# Patient Record
Sex: FEMALE | Race: BLACK OR AFRICAN AMERICAN | NOT HISPANIC OR LATINO
[De-identification: names, ages, dates, MRNs, and addresses within clinical notes are randomized per-mention and may not be internally consistent; named-entity substitution may affect disease eponyms.]

---

## 2017-03-06 ENCOUNTER — OTHER (OUTPATIENT)
Age: 62
End: 2017-03-06

## 2017-06-06 ENCOUNTER — APPOINTMENT (OUTPATIENT)
Dept: SURGERY | Facility: CLINIC | Age: 62
End: 2017-06-06

## 2017-06-06 DIAGNOSIS — E04.1 NONTOXIC SINGLE THYROID NODULE: ICD-10-CM

## 2017-07-28 ENCOUNTER — OUTPATIENT (OUTPATIENT)
Dept: OUTPATIENT SERVICES | Facility: HOSPITAL | Age: 62
LOS: 1 days | End: 2017-07-28
Payer: COMMERCIAL

## 2017-07-28 VITALS
SYSTOLIC BLOOD PRESSURE: 138 MMHG | DIASTOLIC BLOOD PRESSURE: 82 MMHG | TEMPERATURE: 97 F | WEIGHT: 169.09 LBS | RESPIRATION RATE: 16 BRPM | HEIGHT: 64.5 IN | HEART RATE: 66 BPM

## 2017-07-28 DIAGNOSIS — Z98.890 OTHER SPECIFIED POSTPROCEDURAL STATES: Chronic | ICD-10-CM

## 2017-07-28 DIAGNOSIS — E04.1 NONTOXIC SINGLE THYROID NODULE: ICD-10-CM

## 2017-07-28 DIAGNOSIS — Z90.710 ACQUIRED ABSENCE OF BOTH CERVIX AND UTERUS: Chronic | ICD-10-CM

## 2017-07-28 LAB
BUN SERPL-MCNC: 18 MG/DL — SIGNIFICANT CHANGE UP (ref 7–23)
CALCIUM SERPL-MCNC: 10.8 MG/DL — HIGH (ref 8.4–10.5)
CHLORIDE SERPL-SCNC: 107 MMOL/L — SIGNIFICANT CHANGE UP (ref 98–107)
CO2 SERPL-SCNC: 25 MMOL/L — SIGNIFICANT CHANGE UP (ref 22–31)
CREAT SERPL-MCNC: 0.9 MG/DL — SIGNIFICANT CHANGE UP (ref 0.5–1.3)
GLUCOSE SERPL-MCNC: 86 MG/DL — SIGNIFICANT CHANGE UP (ref 70–99)
HCT VFR BLD CALC: 39 % — SIGNIFICANT CHANGE UP (ref 34.5–45)
HGB BLD-MCNC: 12.6 G/DL — SIGNIFICANT CHANGE UP (ref 11.5–15.5)
MCHC RBC-ENTMCNC: 28.4 PG — SIGNIFICANT CHANGE UP (ref 27–34)
MCHC RBC-ENTMCNC: 32.3 % — SIGNIFICANT CHANGE UP (ref 32–36)
MCV RBC AUTO: 88 FL — SIGNIFICANT CHANGE UP (ref 80–100)
NRBC # FLD: 0 — SIGNIFICANT CHANGE UP
PLATELET # BLD AUTO: 294 K/UL — SIGNIFICANT CHANGE UP (ref 150–400)
PMV BLD: 10.5 FL — SIGNIFICANT CHANGE UP (ref 7–13)
POTASSIUM SERPL-MCNC: 4.7 MMOL/L — SIGNIFICANT CHANGE UP (ref 3.5–5.3)
POTASSIUM SERPL-SCNC: 4.7 MMOL/L — SIGNIFICANT CHANGE UP (ref 3.5–5.3)
RBC # BLD: 4.43 M/UL — SIGNIFICANT CHANGE UP (ref 3.8–5.2)
RBC # FLD: 13.3 % — SIGNIFICANT CHANGE UP (ref 10.3–14.5)
SODIUM SERPL-SCNC: 146 MMOL/L — HIGH (ref 135–145)
T3 SERPL-MCNC: 248 NG/DL — HIGH (ref 80–200)
T4 FREE SERPL-MCNC: 2.01 NG/DL — HIGH (ref 0.9–1.8)
TSH SERPL-MCNC: 0.01 UIU/ML — LOW (ref 0.27–4.2)
WBC # BLD: 6.57 K/UL — SIGNIFICANT CHANGE UP (ref 3.8–10.5)
WBC # FLD AUTO: 6.57 K/UL — SIGNIFICANT CHANGE UP (ref 3.8–10.5)

## 2017-07-28 PROCEDURE — 93010 ELECTROCARDIOGRAM REPORT: CPT

## 2017-07-28 NOTE — H&P PST ADULT - PROBLEM SELECTOR PLAN 1
Left Thyroid Lobectomy scheduled on 8/14/17.  Pre-op instructions provided. Pt verbalized understanding.   Pepcid provided for GI prophylaxis.   Chlorhexidine wash provided and instructions given.   Left voice message with Shelby in Dr. Fuentes's office to notify of pt's symptoms of numbness/tingling in right hand.

## 2017-07-28 NOTE — H&P PST ADULT - HISTORY OF PRESENT ILLNESS
62 year old female with thyroid nodules getting progressively larger, now causing discomfort with swallowing, presents today for presurgical evaluation for 62 year old female with thyroid nodules getting progressively larger, now causing discomfort with swallowing, presents today for presurgical evaluation for Left Thyroid Lobectomy scheduled on 8/14/17.

## 2017-07-28 NOTE — H&P PST ADULT - PSH
H/O abdominal hysterectomy    S/P Bunionectomy Left Foot    S/P LASIK surgery H/O abdominal hysterectomy  2001  S/P Bunionectomy Left Foot    S/P LASIK surgery

## 2017-07-28 NOTE — H&P PST ADULT - FAMILY HISTORY
Mother  Still living? Unknown  Family history of stroke, Age at diagnosis: Age Unknown     Sibling  Still living? Unknown  Family history of brain cancer, Age at diagnosis: Age Unknown     Father  Still living? Unknown  Family history of lung cancer, Age at diagnosis: Age Unknown

## 2017-07-28 NOTE — H&P PST ADULT - PMH
Hallux Valgus Right Foot    Thyroid Nodule    Uterine leiomyoma Hallux Valgus Right Foot    Thyroid Nodule    Uterine leiomyoma    Vertigo

## 2017-07-28 NOTE — H&P PST ADULT - RS GEN PE MLT RESP DETAILS PC
respirations non-labored/good air movement/breath sounds equal/clear to auscultation bilaterally/airway patent

## 2017-07-28 NOTE — H&P PST ADULT - MUSCULOSKELETAL
details… detailed exam no calf tenderness/ROM intact/no joint warmth/no joint swelling/no joint erythema/normal strength

## 2017-07-28 NOTE — H&P PST ADULT - NEGATIVE ENMT SYMPTOMS
no hearing difficulty/no tinnitus/no throat pain/no dysphagia/no sinus symptoms/no vertigo/no ear pain

## 2017-08-14 ENCOUNTER — APPOINTMENT (OUTPATIENT)
Dept: SURGERY | Facility: HOSPITAL | Age: 62
End: 2017-08-14

## 2017-10-24 ENCOUNTER — OTHER (OUTPATIENT)
Age: 62
End: 2017-10-24

## 2017-11-13 ENCOUNTER — APPOINTMENT (OUTPATIENT)
Dept: SURGERY | Facility: HOSPITAL | Age: 62
End: 2017-11-13

## 2019-01-02 ENCOUNTER — APPOINTMENT (RX ONLY)
Dept: URBAN - METROPOLITAN AREA CLINIC 133 | Facility: CLINIC | Age: 64
Setting detail: DERMATOLOGY
End: 2019-01-02

## 2019-01-02 DIAGNOSIS — L21.8 OTHER SEBORRHEIC DERMATITIS: ICD-10-CM

## 2019-01-02 DIAGNOSIS — L85.3 XEROSIS CUTIS: ICD-10-CM

## 2019-01-02 DIAGNOSIS — L71.8 OTHER ROSACEA: ICD-10-CM

## 2019-01-02 PROBLEM — F41.9 ANXIETY DISORDER, UNSPECIFIED: Status: ACTIVE | Noted: 2019-01-02

## 2019-01-02 PROBLEM — L29.8 OTHER PRURITUS: Status: ACTIVE | Noted: 2019-01-02

## 2019-01-02 PROBLEM — F32.9 MAJOR DEPRESSIVE DISORDER, SINGLE EPISODE, UNSPECIFIED: Status: ACTIVE | Noted: 2019-01-02

## 2019-01-02 PROBLEM — I10 ESSENTIAL (PRIMARY) HYPERTENSION: Status: ACTIVE | Noted: 2019-01-02

## 2019-01-02 PROBLEM — L70.0 ACNE VULGARIS: Status: ACTIVE | Noted: 2019-01-02

## 2019-01-02 PROBLEM — E13.9 OTHER SPECIFIED DIABETES MELLITUS WITHOUT COMPLICATIONS: Status: ACTIVE | Noted: 2019-01-02

## 2019-01-02 PROBLEM — L55.1 SUNBURN OF SECOND DEGREE: Status: ACTIVE | Noted: 2019-01-02

## 2019-01-02 PROBLEM — M12.9 ARTHROPATHY, UNSPECIFIED: Status: ACTIVE | Noted: 2019-01-02

## 2019-01-02 PROBLEM — J44.9 CHRONIC OBSTRUCTIVE PULMONARY DISEASE, UNSPECIFIED: Status: ACTIVE | Noted: 2019-01-02

## 2019-01-02 PROBLEM — I63.50 CEREBRAL INFARCTION DUE TO UNSPECIFIED OCCLUSION OR STENOSIS OF UNSPECIFIED CEREBRAL ARTERY: Status: ACTIVE | Noted: 2019-01-02

## 2019-01-02 PROBLEM — H91.90 UNSPECIFIED HEARING LOSS, UNSPECIFIED EAR: Status: ACTIVE | Noted: 2019-01-02

## 2019-01-02 PROBLEM — E05.90 THYROTOXICOSIS, UNSPECIFIED WITHOUT THYROTOXIC CRISIS OR STORM: Status: ACTIVE | Noted: 2019-01-02

## 2019-01-02 PROBLEM — E78.5 HYPERLIPIDEMIA, UNSPECIFIED: Status: ACTIVE | Noted: 2019-01-02

## 2019-01-02 PROCEDURE — ? COUNSELING

## 2019-01-02 PROCEDURE — ? PRESCRIPTION

## 2019-01-02 PROCEDURE — 99202 OFFICE O/P NEW SF 15 MIN: CPT

## 2019-01-02 RX ORDER — FLUOCINONIDE 0.5 MG/ML
SOLUTION TOPICAL
Qty: 1 | Refills: 3 | Status: ERX | COMMUNITY
Start: 2019-01-02

## 2019-01-02 RX ORDER — METRONIDAZOLE 7.5 MG/G
CREAM TOPICAL
Qty: 1 | Refills: 3 | Status: ERX | COMMUNITY
Start: 2019-01-02

## 2019-01-02 RX ORDER — KETOCONAZOLE 20.5 MG/ML
SHAMPOO, SUSPENSION TOPICAL
Qty: 1 | Refills: 3 | Status: ERX | COMMUNITY
Start: 2019-01-02

## 2019-01-02 RX ADMIN — METRONIDAZOLE: 7.5 CREAM TOPICAL at 00:00

## 2019-01-02 RX ADMIN — KETOCONAZOLE: 20.5 SHAMPOO, SUSPENSION TOPICAL at 00:00

## 2019-01-02 RX ADMIN — FLUOCINONIDE: 0.5 SOLUTION TOPICAL at 00:00

## 2019-01-02 ASSESSMENT — LOCATION SIMPLE DESCRIPTION DERM
LOCATION SIMPLE: SCALP
LOCATION SIMPLE: LEFT FOREARM
LOCATION SIMPLE: NOSE
LOCATION SIMPLE: RIGHT NOSE

## 2019-01-02 ASSESSMENT — LOCATION DETAILED DESCRIPTION DERM
LOCATION DETAILED: RIGHT NASAL SIDEWALL
LOCATION DETAILED: NASAL TIP
LOCATION DETAILED: LEFT DISTAL DORSAL FOREARM
LOCATION DETAILED: LEFT SUPERIOR PARIETAL SCALP

## 2019-01-02 ASSESSMENT — LOCATION ZONE DERM
LOCATION ZONE: ARM
LOCATION ZONE: NOSE
LOCATION ZONE: SCALP

## 2019-01-02 NOTE — HPI: SKIN LESIONS
How Severe Is Your Skin Lesion?: moderate
Have Your Skin Lesions Been Treated?: not been treated
Is This A New Presentation, Or A Follow-Up?: Skin Lesion
Which Family Member (Optional)?: Sister and father

## 2019-04-23 ENCOUNTER — APPOINTMENT (RX ONLY)
Dept: URBAN - METROPOLITAN AREA CLINIC 133 | Facility: CLINIC | Age: 64
Setting detail: DERMATOLOGY
End: 2019-04-23

## 2019-04-23 DIAGNOSIS — L71.8 OTHER ROSACEA: ICD-10-CM

## 2019-04-23 DIAGNOSIS — L21.8 OTHER SEBORRHEIC DERMATITIS: ICD-10-CM

## 2019-04-23 DIAGNOSIS — L82.1 OTHER SEBORRHEIC KERATOSIS: ICD-10-CM

## 2019-04-23 PROCEDURE — 99213 OFFICE O/P EST LOW 20 MIN: CPT

## 2019-04-23 PROCEDURE — ? PRESCRIPTION

## 2019-04-23 PROCEDURE — ? PRESCRIPTION MEDICATION MANAGEMENT

## 2019-04-23 PROCEDURE — ? COUNSELING

## 2019-04-23 RX ORDER — FLUOCINONIDE 0.5 MG/ML
SOLUTION TOPICAL
Qty: 1 | Refills: 1 | Status: ERX

## 2019-04-23 RX ORDER — KETOCONAZOLE 20.5 MG/ML
SHAMPOO, SUSPENSION TOPICAL
Qty: 1 | Refills: 1 | Status: ERX

## 2019-04-23 ASSESSMENT — LOCATION ZONE DERM
LOCATION ZONE: TRUNK
LOCATION ZONE: FACE
LOCATION ZONE: SCALP
LOCATION ZONE: FACE

## 2019-04-23 ASSESSMENT — LOCATION DETAILED DESCRIPTION DERM
LOCATION DETAILED: HAIR
LOCATION DETAILED: SUPERIOR THORACIC SPINE
LOCATION DETAILED: LEFT INFERIOR CENTRAL MALAR CHEEK
LOCATION DETAILED: LEFT INFERIOR LATERAL MALAR CHEEK
LOCATION DETAILED: LEFT INFERIOR CENTRAL MALAR CHEEK

## 2019-04-23 ASSESSMENT — LOCATION SIMPLE DESCRIPTION DERM
LOCATION SIMPLE: LEFT CHEEK
LOCATION SIMPLE: HAIR
LOCATION SIMPLE: LEFT CHEEK
LOCATION SIMPLE: UPPER BACK

## 2019-11-18 ENCOUNTER — APPOINTMENT (RX ONLY)
Dept: URBAN - METROPOLITAN AREA CLINIC 133 | Facility: CLINIC | Age: 64
Setting detail: DERMATOLOGY
End: 2019-11-18

## 2019-11-18 DIAGNOSIS — L71.8 OTHER ROSACEA: ICD-10-CM

## 2019-11-18 DIAGNOSIS — L21.8 OTHER SEBORRHEIC DERMATITIS: ICD-10-CM

## 2019-11-18 PROCEDURE — ? PRESCRIPTION MEDICATION MANAGEMENT

## 2019-11-18 PROCEDURE — 99213 OFFICE O/P EST LOW 20 MIN: CPT

## 2019-11-18 PROCEDURE — ? PRESCRIPTION

## 2019-11-18 PROCEDURE — ? COUNSELING

## 2019-11-18 RX ORDER — FLUOCINONIDE 0.5 MG/ML
SOLUTION TOPICAL
Qty: 1 | Refills: 1 | Status: ERX | COMMUNITY
Start: 2019-11-18

## 2019-11-18 RX ORDER — KETOCONAZOLE 20.5 MG/ML
SHAMPOO, SUSPENSION TOPICAL
Qty: 1 | Refills: 3 | Status: ERX | COMMUNITY
Start: 2019-11-18

## 2019-11-18 RX ORDER — METRONIDAZOLE 7.5 MG/G
CREAM TOPICAL
Qty: 1 | Refills: 3 | Status: ERX | COMMUNITY
Start: 2019-11-18

## 2019-11-18 RX ADMIN — KETOCONAZOLE: 20.5 SHAMPOO, SUSPENSION TOPICAL at 00:00

## 2019-11-18 RX ADMIN — FLUOCINONIDE: 0.5 SOLUTION TOPICAL at 00:00

## 2019-11-18 RX ADMIN — METRONIDAZOLE: 7.5 CREAM TOPICAL at 00:00

## 2019-11-18 ASSESSMENT — LOCATION SIMPLE DESCRIPTION DERM
LOCATION SIMPLE: LEFT CHEEK
LOCATION SIMPLE: HAIR
LOCATION SIMPLE: LEFT CHEEK

## 2019-11-18 ASSESSMENT — LOCATION DETAILED DESCRIPTION DERM
LOCATION DETAILED: LEFT INFERIOR CENTRAL MALAR CHEEK
LOCATION DETAILED: LEFT INFERIOR LATERAL MALAR CHEEK
LOCATION DETAILED: HAIR
LOCATION DETAILED: LEFT INFERIOR CENTRAL MALAR CHEEK

## 2019-11-18 ASSESSMENT — LOCATION ZONE DERM
LOCATION ZONE: SCALP
LOCATION ZONE: FACE
LOCATION ZONE: FACE

## 2019-11-18 NOTE — PROCEDURE: MIPS QUALITY
Quality 110: Preventive Care And Screening: Influenza Immunization: Influenza Immunization Administered during Influenza season
Quality 431: Preventive Care And Screening: Unhealthy Alcohol Use - Screening: Patient screened for unhealthy alcohol use using a single question and scores less than 2 times per year
Quality 111:Pneumonia Vaccination Status For Older Adults: Pneumococcal Vaccination Previously Received
Quality 226: Preventive Care And Screening: Tobacco Use: Screening And Cessation Intervention: Patient screened for tobacco use, is a smoker AND did not received Cessation Counseling for Medical Reasons
Detail Level: Detailed
Quality 130: Documentation Of Current Medications In The Medical Record: Current Medications Documented

## 2020-06-02 ENCOUNTER — APPOINTMENT (RX ONLY)
Dept: URBAN - METROPOLITAN AREA CLINIC 133 | Facility: CLINIC | Age: 65
Setting detail: DERMATOLOGY
End: 2020-06-02

## 2020-06-02 DIAGNOSIS — L71.8 OTHER ROSACEA: ICD-10-CM

## 2020-06-02 DIAGNOSIS — L23.9 ALLERGIC CONTACT DERMATITIS, UNSPECIFIED CAUSE: ICD-10-CM

## 2020-06-02 DIAGNOSIS — L21.8 OTHER SEBORRHEIC DERMATITIS: ICD-10-CM

## 2020-06-02 PROCEDURE — 99213 OFFICE O/P EST LOW 20 MIN: CPT

## 2020-06-02 PROCEDURE — ? PRESCRIPTION

## 2020-06-02 PROCEDURE — ? COUNSELING

## 2020-06-02 PROCEDURE — ? PRESCRIPTION MEDICATION MANAGEMENT

## 2020-06-02 PROCEDURE — ? ADDITIONAL NOTES

## 2020-06-02 RX ORDER — KETOCONAZOLE 20.5 MG/ML
SHAMPOO, SUSPENSION TOPICAL
Qty: 1 | Refills: 3 | Status: ERX

## 2020-06-02 RX ORDER — TRIAMCINOLONE ACETONIDE 1 MG/G
CREAM TOPICAL
Qty: 1 | Refills: 1 | Status: ERX | COMMUNITY
Start: 2020-06-02

## 2020-06-02 RX ADMIN — TRIAMCINOLONE ACETONIDE: 1 CREAM TOPICAL at 00:00

## 2020-06-02 ASSESSMENT — LOCATION SIMPLE DESCRIPTION DERM
LOCATION SIMPLE: LEFT CHEEK
LOCATION SIMPLE: POSTERIOR NECK
LOCATION SIMPLE: HAIR
LOCATION SIMPLE: LEFT CHEEK

## 2020-06-02 ASSESSMENT — LOCATION DETAILED DESCRIPTION DERM
LOCATION DETAILED: HAIR
LOCATION DETAILED: LEFT INFERIOR LATERAL MALAR CHEEK
LOCATION DETAILED: MID POSTERIOR NECK
LOCATION DETAILED: LEFT INFERIOR CENTRAL MALAR CHEEK
LOCATION DETAILED: LEFT INFERIOR CENTRAL MALAR CHEEK

## 2020-06-02 ASSESSMENT — LOCATION ZONE DERM
LOCATION ZONE: NECK
LOCATION ZONE: FACE
LOCATION ZONE: SCALP
LOCATION ZONE: FACE

## 2020-06-02 NOTE — PROCEDURE: MIPS QUALITY
Detail Level: Detailed
Quality 111:Pneumonia Vaccination Status For Older Adults: Pneumococcal Vaccination Previously Received
Quality 130: Documentation Of Current Medications In The Medical Record: Current Medications Documented
Quality 110: Preventive Care And Screening: Influenza Immunization: Influenza Immunization Administered during Influenza season
Quality 226: Preventive Care And Screening: Tobacco Use: Screening And Cessation Intervention: Patient screened for tobacco use, is a smoker AND did not received Cessation Counseling for Medical Reasons
Quality 431: Preventive Care And Screening: Unhealthy Alcohol Use - Screening: Patient screened for unhealthy alcohol use using a single question and scores less than 2 times per year

## 2020-06-02 NOTE — PROCEDURE: ADDITIONAL NOTES
Detail Level: Simple
Additional Notes: Patient consent was obtained to proceed with the visit and recommended plan of care after discussion of all risks and benefits, including the risks of COVID-19 exposure.
Additional Notes: Continue using fluocinonide solution on scalp qhs x2 weeks

## 2020-09-02 ENCOUNTER — APPOINTMENT (RX ONLY)
Dept: URBAN - METROPOLITAN AREA CLINIC 133 | Facility: CLINIC | Age: 65
Setting detail: DERMATOLOGY
End: 2020-09-02

## 2020-09-02 DIAGNOSIS — L21.8 OTHER SEBORRHEIC DERMATITIS: ICD-10-CM | Status: STABLE

## 2020-09-02 DIAGNOSIS — L71.8 OTHER ROSACEA: ICD-10-CM | Status: STABLE

## 2020-09-02 DIAGNOSIS — L23.9 ALLERGIC CONTACT DERMATITIS, UNSPECIFIED CAUSE: ICD-10-CM | Status: RESOLVED

## 2020-09-02 PROCEDURE — ? PRESCRIPTION MEDICATION MANAGEMENT

## 2020-09-02 PROCEDURE — ? COUNSELING

## 2020-09-02 PROCEDURE — ? ADDITIONAL NOTES

## 2020-09-02 PROCEDURE — 99213 OFFICE O/P EST LOW 20 MIN: CPT

## 2020-09-02 PROCEDURE — ? PRESCRIPTION

## 2020-09-02 RX ORDER — KETOCONAZOLE 20 MG/ML
SHAMPOO, SUSPENSION TOPICAL
Qty: 1 | Refills: 4 | Status: ERX

## 2020-09-02 RX ORDER — KETOCONAZOLE 20 MG/G
CREAM TOPICAL
Qty: 1 | Refills: 1 | Status: ERX | COMMUNITY
Start: 2020-09-02

## 2020-09-02 RX ORDER — HYDROCORTISONE 25 MG/G
CREAM TOPICAL
Qty: 1 | Refills: 1 | Status: ERX | COMMUNITY
Start: 2020-09-02

## 2020-09-02 RX ORDER — FLUOCINONIDE 0.5 MG/ML
SOLUTION TOPICAL
Qty: 1 | Refills: 2 | Status: ERX | COMMUNITY
Start: 2020-09-02

## 2020-09-02 RX ADMIN — HYDROCORTISONE: 25 CREAM TOPICAL at 00:00

## 2020-09-02 RX ADMIN — FLUOCINONIDE: 0.5 SOLUTION TOPICAL at 00:00

## 2020-09-02 RX ADMIN — KETOCONAZOLE: 20 CREAM TOPICAL at 00:00

## 2020-09-02 ASSESSMENT — LOCATION DETAILED DESCRIPTION DERM
LOCATION DETAILED: HAIR
LOCATION DETAILED: LEFT INFERIOR CENTRAL MALAR CHEEK
LOCATION DETAILED: LEFT INFERIOR CENTRAL MALAR CHEEK
LOCATION DETAILED: MID POSTERIOR NECK

## 2020-09-02 ASSESSMENT — LOCATION ZONE DERM
LOCATION ZONE: SCALP
LOCATION ZONE: FACE
LOCATION ZONE: FACE
LOCATION ZONE: NECK

## 2020-09-02 ASSESSMENT — LOCATION SIMPLE DESCRIPTION DERM
LOCATION SIMPLE: LEFT CHEEK
LOCATION SIMPLE: LEFT CHEEK
LOCATION SIMPLE: HAIR
LOCATION SIMPLE: POSTERIOR NECK

## 2021-03-02 ENCOUNTER — APPOINTMENT (RX ONLY)
Dept: URBAN - METROPOLITAN AREA CLINIC 133 | Facility: CLINIC | Age: 66
Setting detail: DERMATOLOGY
End: 2021-03-02

## 2021-03-02 DIAGNOSIS — L21.8 OTHER SEBORRHEIC DERMATITIS: ICD-10-CM | Status: INADEQUATELY CONTROLLED

## 2021-03-02 DIAGNOSIS — L57.0 ACTINIC KERATOSIS: ICD-10-CM

## 2021-03-02 DIAGNOSIS — L71.8 OTHER ROSACEA: ICD-10-CM

## 2021-03-02 PROCEDURE — 17000 DESTRUCT PREMALG LESION: CPT

## 2021-03-02 PROCEDURE — ? PRESCRIPTION MEDICATION MANAGEMENT

## 2021-03-02 PROCEDURE — 99214 OFFICE O/P EST MOD 30 MIN: CPT | Mod: 25

## 2021-03-02 PROCEDURE — ? LIQUID NITROGEN

## 2021-03-02 PROCEDURE — ? ADDITIONAL NOTES

## 2021-03-02 PROCEDURE — ? COUNSELING

## 2021-03-02 ASSESSMENT — LOCATION SIMPLE DESCRIPTION DERM
LOCATION SIMPLE: LEFT CHEEK
LOCATION SIMPLE: LEFT HAND
LOCATION SIMPLE: LEFT CHEEK
LOCATION SIMPLE: HAIR

## 2021-03-02 ASSESSMENT — LOCATION ZONE DERM
LOCATION ZONE: HAND
LOCATION ZONE: SCALP
LOCATION ZONE: FACE
LOCATION ZONE: FACE

## 2021-03-02 ASSESSMENT — LOCATION DETAILED DESCRIPTION DERM
LOCATION DETAILED: LEFT INFERIOR CENTRAL MALAR CHEEK
LOCATION DETAILED: LEFT RADIAL DORSAL HAND
LOCATION DETAILED: HAIR
LOCATION DETAILED: LEFT INFERIOR CENTRAL MALAR CHEEK

## 2021-03-02 NOTE — PROCEDURE: PRESCRIPTION MEDICATION MANAGEMENT
Detail Level: Zone
Render In Strict Bullet Format?: No
Continue Regimen: Metrocream 0.75% BID\\nAvoid triggers\\nSunscreen daily
Continue Regimen: Ketoconazole 2% Cream BID X 2 weeks then PRN for flares \\nHydrocortisone 2.5% Cream BID X 2 weeks then PRN for flares

## 2021-03-02 NOTE — PROCEDURE: LIQUID NITROGEN
Number Of Freeze-Thaw Cycles: 2 freeze-thaw cycles
Duration Of Freeze Thaw-Cycle (Seconds): 3
Detail Level: Simple
Render Post-Care Instructions In Note?: no
Post-Care Instructions: I reviewed with the patient in detail post-care instructions. Patient is to wear sunprotection, and avoid picking at any of the treated lesions. Pt may apply Vaseline to crusted or scabbing areas.
Consent: The patient's consent was obtained including but not limited to risks of crusting, scabbing, blistering, scarring, darker or lighter pigmentary change, recurrence, incomplete removal and infection.

## 2022-11-09 ENCOUNTER — RX ONLY (RX ONLY)
Age: 67
End: 2022-11-09

## 2022-11-09 ENCOUNTER — OFFICE (OUTPATIENT)
Dept: URBAN - METROPOLITAN AREA CLINIC 93 | Facility: CLINIC | Age: 67
Setting detail: OPHTHALMOLOGY
End: 2022-11-09
Payer: MEDICARE

## 2022-11-09 VITALS — WEIGHT: 179 LBS | BODY MASS INDEX: 29.82 KG/M2 | HEIGHT: 65 IN

## 2022-11-09 DIAGNOSIS — H16.223: ICD-10-CM

## 2022-11-09 DIAGNOSIS — H25.13: ICD-10-CM

## 2022-11-09 DIAGNOSIS — H02.89: ICD-10-CM

## 2022-11-09 PROCEDURE — 99203 OFFICE O/P NEW LOW 30 MIN: CPT | Performed by: OPHTHALMOLOGY

## 2022-11-09 ASSESSMENT — REFRACTION_CURRENTRX
OS_SPHERE: +1.75
OD_OVR_VA: 20/
OD_AXIS: 129
OD_ADD: +2.75
OD_CYLINDER: -0.50
OD_SPHERE: +0.25
OS_ADD: +2.75
OS_CYLINDER: -1.50
OS_VPRISM_DIRECTION: PROGS
OS_AXIS: 94
OS_OVR_VA: 20/
OD_VPRISM_DIRECTION: PROGS

## 2022-11-09 ASSESSMENT — KERATOMETRY
OD_K1POWER_DIOPTERS: 40.25
OS_AXISANGLE_DEGREES: 75
OS_K2POWER_DIOPTERS: 3.00
OD_AXISANGLE_DEGREES: 98
OS_K1POWER_DIOPTERS: 43.25
OD_AXISANGLE_DEGREES: 98
OD_CYLAXISANGLE_DEGREES: 98
OD_K1POWER_DIOPTERS: 40.25
OD_K2POWER_DIOPTERS: 41.25
OD_CYLPOWER_DEGREES: 1
OD_K1K2_AVERAGE: 40.75
OS_CYLPOWER_DEGREES: 40.25
OS_K1POWER_DIOPTERS: 43.25
OS_AXISANGLE_DEGREES: 75
OS_CYLAXISANGLE_DEGREES: 75
OS_K2POWER_DIOPTERS: 3.00
OS_K1K2_AVERAGE: 23.125
OD_K2POWER_DIOPTERS: 41.25
OS_AXISANGLE2_DEGREES: 75
OD_AXISANGLE2_DEGREES: 98

## 2022-11-09 ASSESSMENT — AXIALLENGTH_DERIVED
OD_AL: 24.4364
OS_AL: 33.44

## 2022-11-09 ASSESSMENT — REFRACTION_AUTOREFRACTION
OD_SPHERE: +0.75
OD_AXIS: 22
OS_AXIS: 84
OS_SPHERE: +1.75
OS_CYLINDER: -0.75
OD_CYLINDER: -0.50

## 2022-11-09 ASSESSMENT — CONFRONTATIONAL VISUAL FIELD TEST (CVF)
OD_FINDINGS: FULL
OS_FINDINGS: FULL

## 2022-11-09 ASSESSMENT — VISUAL ACUITY
OD_BCVA: 20/40
OS_BCVA: 20/50

## 2022-11-09 ASSESSMENT — SPHEQUIV_DERIVED
OD_SPHEQUIV: 0.5
OS_SPHEQUIV: 1.375

## 2022-12-01 ENCOUNTER — OFFICE (OUTPATIENT)
Dept: URBAN - METROPOLITAN AREA CLINIC 93 | Facility: CLINIC | Age: 67
Setting detail: OPHTHALMOLOGY
End: 2022-12-01
Payer: MEDICARE

## 2022-12-01 DIAGNOSIS — H16.223: ICD-10-CM

## 2022-12-01 DIAGNOSIS — H25.13: ICD-10-CM

## 2022-12-01 DIAGNOSIS — H02.89: ICD-10-CM

## 2022-12-01 PROCEDURE — 99213 OFFICE O/P EST LOW 20 MIN: CPT | Performed by: OPHTHALMOLOGY

## 2022-12-01 ASSESSMENT — REFRACTION_CURRENTRX
OD_VPRISM_DIRECTION: PROGS
OS_SPHERE: +1.75
OS_VPRISM_DIRECTION: PROGS
OS_ADD: +2.75
OD_AXIS: 129
OS_CYLINDER: -1.50
OD_CYLINDER: -0.50
OD_ADD: +2.75
OS_AXIS: 94
OS_OVR_VA: 20/
OD_SPHERE: +0.25
OD_OVR_VA: 20/

## 2022-12-01 ASSESSMENT — REFRACTION_AUTOREFRACTION
OS_AXIS: 84
OS_CYLINDER: -0.75
OD_AXIS: 22
OS_SPHERE: +1.75
OD_CYLINDER: -0.50
OD_SPHERE: +0.75

## 2022-12-01 ASSESSMENT — KERATOMETRY
OS_AXISANGLE_DEGREES: 75
OD_K1POWER_DIOPTERS: 40.25
OD_AXISANGLE_DEGREES: 98
OS_K2POWER_DIOPTERS: 3.00
OS_K1POWER_DIOPTERS: 43.25
OD_K2POWER_DIOPTERS: 41.25

## 2022-12-01 ASSESSMENT — VISUAL ACUITY
OS_BCVA: 20/50
OD_BCVA: 20/50-2

## 2022-12-01 ASSESSMENT — SPHEQUIV_DERIVED
OD_SPHEQUIV: 0.5
OS_SPHEQUIV: 1.375

## 2022-12-01 ASSESSMENT — CONFRONTATIONAL VISUAL FIELD TEST (CVF)
OS_FINDINGS: FULL
OD_FINDINGS: FULL

## 2022-12-01 ASSESSMENT — AXIALLENGTH_DERIVED
OD_AL: 24.4364
OS_AL: 33.44

## 2023-02-27 ENCOUNTER — OFFICE (OUTPATIENT)
Facility: LOCATION | Age: 68
Setting detail: OPHTHALMOLOGY
End: 2023-02-27
Payer: MEDICARE

## 2023-02-27 DIAGNOSIS — H25.13: ICD-10-CM

## 2023-02-27 DIAGNOSIS — H25.12: ICD-10-CM

## 2023-02-27 DIAGNOSIS — H25.11: ICD-10-CM

## 2023-02-27 PROCEDURE — 92136 OPHTHALMIC BIOMETRY: CPT | Performed by: OPHTHALMOLOGY

## 2023-02-27 PROCEDURE — 92012 INTRM OPH EXAM EST PATIENT: CPT | Performed by: OPHTHALMOLOGY

## 2023-02-27 ASSESSMENT — REFRACTION_CURRENTRX
OS_AXIS: 94
OD_ADD: +2.75
OS_CYLINDER: -1.50
OS_ADD: +2.75
OS_VPRISM_DIRECTION: PROGS
OS_OVR_VA: 20/
OD_VPRISM_DIRECTION: PROGS
OS_SPHERE: +1.75
OD_AXIS: 129
OD_OVR_VA: 20/
OD_CYLINDER: -0.50
OD_SPHERE: +0.25

## 2023-02-27 ASSESSMENT — REFRACTION_AUTOREFRACTION
OD_AXIS: 22
OD_SPHERE: +0.75
OS_CYLINDER: -0.75
OS_SPHERE: +1.75
OD_CYLINDER: -0.50
OS_AXIS: 84

## 2023-02-27 ASSESSMENT — KERATOMETRY
OD_AXISANGLE_DEGREES: 98
OS_K2POWER_DIOPTERS: 3.00
OD_K1POWER_DIOPTERS: 40.25
OS_AXISANGLE_DEGREES: 75
OD_K2POWER_DIOPTERS: 41.25
OS_K1POWER_DIOPTERS: 43.25

## 2023-02-27 ASSESSMENT — AXIALLENGTH_DERIVED
OS_AL: 33.44
OD_AL: 24.4364

## 2023-02-27 ASSESSMENT — SPHEQUIV_DERIVED
OS_SPHEQUIV: 1.375
OD_SPHEQUIV: 0.5

## 2023-02-27 ASSESSMENT — VISUAL ACUITY
OS_BCVA: 20/50-2
OD_BCVA: 20/40-1

## 2023-02-27 ASSESSMENT — CONFRONTATIONAL VISUAL FIELD TEST (CVF)
OD_FINDINGS: FULL
OS_FINDINGS: FULL

## 2023-04-04 ENCOUNTER — ASC (OUTPATIENT)
Dept: URBAN - METROPOLITAN AREA SURGERY 8 | Facility: SURGERY | Age: 68
Setting detail: OPHTHALMOLOGY
End: 2023-04-04
Payer: MEDICARE

## 2023-04-04 DIAGNOSIS — H25.12: ICD-10-CM

## 2023-04-04 PROCEDURE — 66984 XCAPSL CTRC RMVL W/O ECP: CPT | Performed by: OPHTHALMOLOGY

## 2023-05-02 ENCOUNTER — ASC (OUTPATIENT)
Dept: URBAN - METROPOLITAN AREA SURGERY 8 | Facility: SURGERY | Age: 68
Setting detail: OPHTHALMOLOGY
End: 2023-05-02
Payer: MEDICARE

## 2023-05-02 DIAGNOSIS — H25.12: ICD-10-CM

## 2023-05-02 DIAGNOSIS — H52.212: ICD-10-CM

## 2023-05-02 PROCEDURE — FEMTO FEMTOSECOND LASER: Performed by: OPHTHALMOLOGY

## 2023-05-02 PROCEDURE — A9270 NON-COVERED ITEM OR SERVICE: HCPCS | Performed by: OPHTHALMOLOGY

## 2023-05-02 PROCEDURE — 68841 INSJ RX ELUT IMPLT LAC CANAL: CPT | Performed by: OPHTHALMOLOGY

## 2023-05-02 PROCEDURE — 66984 XCAPSL CTRC RMVL W/O ECP: CPT | Performed by: OPHTHALMOLOGY

## 2023-05-03 ENCOUNTER — OFFICE (OUTPATIENT)
Facility: LOCATION | Age: 68
Setting detail: OPHTHALMOLOGY
End: 2023-05-03
Payer: MEDICARE

## 2023-05-03 DIAGNOSIS — H25.11: ICD-10-CM

## 2023-05-03 PROCEDURE — 99024 POSTOP FOLLOW-UP VISIT: CPT | Performed by: OPHTHALMOLOGY

## 2023-05-03 ASSESSMENT — REFRACTION_CURRENTRX
OS_OVR_VA: 20/
OD_AXIS: 129
OS_CYLINDER: -1.50
OS_AXIS: 94
OD_SPHERE: +0.25
OD_CYLINDER: -0.50
OS_ADD: +2.75
OD_OVR_VA: 20/
OS_SPHERE: +1.75
OS_VPRISM_DIRECTION: PROGS
OD_ADD: +2.75
OD_VPRISM_DIRECTION: PROGS

## 2023-05-03 ASSESSMENT — KERATOMETRY
OD_AXISANGLE_DEGREES: 100
OS_K2POWER_DIOPTERS: 41.75
OS_K1POWER_DIOPTERS: 41.25
OD_K1POWER_DIOPTERS: 40.25
OD_K2POWER_DIOPTERS: 41.50
OS_AXISANGLE_DEGREES: 077

## 2023-05-03 ASSESSMENT — VISUAL ACUITY
OS_BCVA: 20/50-2
OD_BCVA: 20/40

## 2023-05-03 ASSESSMENT — REFRACTION_AUTOREFRACTION
OD_SPHERE: +0.75
OD_AXIS: 020
OS_SPHERE: +0.75
OD_CYLINDER: -0.75
OS_AXIS: 176
OS_CYLINDER: -0.50

## 2023-05-03 ASSESSMENT — CONFRONTATIONAL VISUAL FIELD TEST (CVF)
OS_FINDINGS: FULL
OD_FINDINGS: FULL

## 2023-05-03 ASSESSMENT — AXIALLENGTH_DERIVED
OS_AL: 24.1444
OD_AL: 24.4394

## 2023-05-03 ASSESSMENT — SPHEQUIV_DERIVED
OS_SPHEQUIV: 0.5
OD_SPHEQUIV: 0.375

## 2023-05-16 ENCOUNTER — ASC (OUTPATIENT)
Dept: URBAN - METROPOLITAN AREA SURGERY 8 | Facility: SURGERY | Age: 68
Setting detail: OPHTHALMOLOGY
End: 2023-05-16
Payer: MEDICARE

## 2023-05-16 DIAGNOSIS — H52.211: ICD-10-CM

## 2023-05-16 DIAGNOSIS — H25.11: ICD-10-CM

## 2023-05-16 PROCEDURE — A9270 NON-COVERED ITEM OR SERVICE: HCPCS | Performed by: OPHTHALMOLOGY

## 2023-05-16 PROCEDURE — 68841 INSJ RX ELUT IMPLT LAC CANAL: CPT | Performed by: OPHTHALMOLOGY

## 2023-05-16 PROCEDURE — 66984 XCAPSL CTRC RMVL W/O ECP: CPT | Performed by: OPHTHALMOLOGY

## 2023-05-16 PROCEDURE — FEMTO FEMTOSECOND LASER: Performed by: OPHTHALMOLOGY

## 2023-05-17 ENCOUNTER — RX ONLY (RX ONLY)
Age: 68
End: 2023-05-17

## 2023-05-17 ENCOUNTER — OFFICE (OUTPATIENT)
Facility: LOCATION | Age: 68
Setting detail: OPHTHALMOLOGY
End: 2023-05-17
Payer: MEDICARE

## 2023-05-17 DIAGNOSIS — Z96.1: ICD-10-CM

## 2023-05-17 PROCEDURE — 99024 POSTOP FOLLOW-UP VISIT: CPT | Performed by: OPHTHALMOLOGY

## 2023-05-17 ASSESSMENT — AXIALLENGTH_DERIVED
OS_AL: 24.2674
OD_AL: 24.6289

## 2023-05-17 ASSESSMENT — KERATOMETRY
OS_AXISANGLE_DEGREES: 113
OD_K2POWER_DIOPTERS: 40.75
OD_AXISANGLE_DEGREES: 112
OD_K1POWER_DIOPTERS: 39.25
OS_K1POWER_DIOPTERS: 42.00
OS_K2POWER_DIOPTERS: 42.75

## 2023-05-17 ASSESSMENT — REFRACTION_AUTOREFRACTION
OD_CYLINDER: -2.00
OD_AXIS: 032
OS_CYLINDER: -0.75
OS_AXIS: 024
OS_SPHERE: -0.25
OD_SPHERE: +1.75

## 2023-05-17 ASSESSMENT — VISUAL ACUITY
OD_BCVA: 20/30
OS_BCVA: 20/70

## 2023-05-17 ASSESSMENT — REFRACTION_CURRENTRX
OS_AXIS: 94
OS_VPRISM_DIRECTION: PROGS
OS_SPHERE: +1.75
OD_VPRISM_DIRECTION: PROGS
OD_CYLINDER: -0.50
OS_CYLINDER: -1.50
OD_OVR_VA: 20/
OD_ADD: +2.75
OS_ADD: +2.75
OD_SPHERE: +0.25
OS_OVR_VA: 20/
OD_AXIS: 129

## 2023-05-17 ASSESSMENT — SPHEQUIV_DERIVED
OS_SPHEQUIV: -0.625
OD_SPHEQUIV: 0.75

## 2023-05-17 ASSESSMENT — CONFRONTATIONAL VISUAL FIELD TEST (CVF)
OD_FINDINGS: FULL
OS_FINDINGS: FULL

## 2023-05-24 ENCOUNTER — RX ONLY (RX ONLY)
Age: 68
End: 2023-05-24

## 2023-05-24 ENCOUNTER — OFFICE (OUTPATIENT)
Facility: LOCATION | Age: 68
Setting detail: OPHTHALMOLOGY
End: 2023-05-24
Payer: MEDICARE

## 2023-05-24 DIAGNOSIS — Z96.1: ICD-10-CM

## 2023-05-24 DIAGNOSIS — H43.393: ICD-10-CM

## 2023-05-24 PROCEDURE — 99024 POSTOP FOLLOW-UP VISIT: CPT | Performed by: OPHTHALMOLOGY

## 2023-05-24 ASSESSMENT — CONFRONTATIONAL VISUAL FIELD TEST (CVF)
OD_FINDINGS: FULL
OS_FINDINGS: FULL

## 2023-05-24 ASSESSMENT — SPHEQUIV_DERIVED
OS_SPHEQUIV: -0.625
OD_SPHEQUIV: 0.75

## 2023-05-24 ASSESSMENT — REFRACTION_AUTOREFRACTION
OD_CYLINDER: -2.00
OD_AXIS: 032
OS_SPHERE: -0.25
OD_SPHERE: +1.75
OS_CYLINDER: -0.75
OS_AXIS: 024

## 2023-05-24 ASSESSMENT — REFRACTION_CURRENTRX
OS_OVR_VA: 20/
OS_SPHERE: +1.75
OS_ADD: +2.75
OD_VPRISM_DIRECTION: PROGS
OD_AXIS: 129
OS_CYLINDER: -1.50
OD_SPHERE: +0.25
OD_ADD: +2.75
OD_CYLINDER: -0.50
OS_AXIS: 94
OD_OVR_VA: 20/
OS_VPRISM_DIRECTION: PROGS

## 2023-05-24 ASSESSMENT — AXIALLENGTH_DERIVED
OS_AL: 24.2674
OD_AL: 24.6289

## 2023-05-24 ASSESSMENT — VISUAL ACUITY
OD_BCVA: 20/50+2
OS_BCVA: 20/30+1

## 2023-05-24 ASSESSMENT — KERATOMETRY
OD_K2POWER_DIOPTERS: 40.75
OD_AXISANGLE_DEGREES: 112
OS_AXISANGLE_DEGREES: 113
OD_K1POWER_DIOPTERS: 39.25
OS_K1POWER_DIOPTERS: 42.00
OS_K2POWER_DIOPTERS: 42.75

## 2023-05-24 ASSESSMENT — TONOMETRY
OD_IOP_MMHG: 17
OS_IOP_MMHG: 18

## 2023-10-08 ENCOUNTER — EMERGENCY (EMERGENCY)
Facility: HOSPITAL | Age: 68
LOS: 0 days | Discharge: HOME | End: 2023-10-08
Attending: STUDENT IN AN ORGANIZED HEALTH CARE EDUCATION/TRAINING PROGRAM
Payer: MEDICARE

## 2023-10-08 VITALS
DIASTOLIC BLOOD PRESSURE: 92 MMHG | SYSTOLIC BLOOD PRESSURE: 144 MMHG | WEIGHT: 175.93 LBS | HEIGHT: 63 IN | TEMPERATURE: 98 F | RESPIRATION RATE: 20 BRPM | HEART RATE: 96 BPM | OXYGEN SATURATION: 97 %

## 2023-10-08 VITALS
SYSTOLIC BLOOD PRESSURE: 133 MMHG | TEMPERATURE: 98 F | RESPIRATION RATE: 19 BRPM | HEART RATE: 61 BPM | DIASTOLIC BLOOD PRESSURE: 75 MMHG | OXYGEN SATURATION: 100 %

## 2023-10-08 DIAGNOSIS — Z98.890 OTHER SPECIFIED POSTPROCEDURAL STATES: Chronic | ICD-10-CM

## 2023-10-08 DIAGNOSIS — Z90.710 ACQUIRED ABSENCE OF BOTH CERVIX AND UTERUS: ICD-10-CM

## 2023-10-08 DIAGNOSIS — Z87.42 PERSONAL HISTORY OF OTHER DISEASES OF THE FEMALE GENITAL TRACT: ICD-10-CM

## 2023-10-08 DIAGNOSIS — M16.11 UNILATERAL PRIMARY OSTEOARTHRITIS, RIGHT HIP: ICD-10-CM

## 2023-10-08 DIAGNOSIS — M25.551 PAIN IN RIGHT HIP: ICD-10-CM

## 2023-10-08 DIAGNOSIS — Z90.710 ACQUIRED ABSENCE OF BOTH CERVIX AND UTERUS: Chronic | ICD-10-CM

## 2023-10-08 PROBLEM — R42 DIZZINESS AND GIDDINESS: Chronic | Status: ACTIVE | Noted: 2017-07-28

## 2023-10-08 PROBLEM — D25.9 LEIOMYOMA OF UTERUS, UNSPECIFIED: Chronic | Status: ACTIVE | Noted: 2017-07-28

## 2023-10-08 LAB
ALBUMIN SERPL ELPH-MCNC: 3.7 G/DL — SIGNIFICANT CHANGE UP (ref 3.3–5)
ALP SERPL-CCNC: 87 U/L — SIGNIFICANT CHANGE UP (ref 40–120)
ALT FLD-CCNC: 25 U/L — SIGNIFICANT CHANGE UP (ref 12–78)
ANION GAP SERPL CALC-SCNC: 4 MMOL/L — LOW (ref 5–17)
AST SERPL-CCNC: 20 U/L — SIGNIFICANT CHANGE UP (ref 15–37)
BASOPHILS # BLD AUTO: 0.03 K/UL — SIGNIFICANT CHANGE UP (ref 0–0.2)
BASOPHILS NFR BLD AUTO: 0.4 % — SIGNIFICANT CHANGE UP (ref 0–2)
BILIRUB SERPL-MCNC: 0.7 MG/DL — SIGNIFICANT CHANGE UP (ref 0.2–1.2)
BUN SERPL-MCNC: 16 MG/DL — SIGNIFICANT CHANGE UP (ref 7–23)
CALCIUM SERPL-MCNC: 10.1 MG/DL — SIGNIFICANT CHANGE UP (ref 8.5–10.1)
CHLORIDE SERPL-SCNC: 111 MMOL/L — HIGH (ref 96–108)
CO2 SERPL-SCNC: 27 MMOL/L — SIGNIFICANT CHANGE UP (ref 22–31)
CREAT SERPL-MCNC: 0.98 MG/DL — SIGNIFICANT CHANGE UP (ref 0.5–1.3)
EGFR: 62 ML/MIN/1.73M2 — SIGNIFICANT CHANGE UP
EOSINOPHIL # BLD AUTO: 0.12 K/UL — SIGNIFICANT CHANGE UP (ref 0–0.5)
EOSINOPHIL NFR BLD AUTO: 1.4 % — SIGNIFICANT CHANGE UP (ref 0–6)
GLUCOSE SERPL-MCNC: 94 MG/DL — SIGNIFICANT CHANGE UP (ref 70–99)
HCT VFR BLD CALC: 38.4 % — SIGNIFICANT CHANGE UP (ref 34.5–45)
HGB BLD-MCNC: 12.2 G/DL — SIGNIFICANT CHANGE UP (ref 11.5–15.5)
IMM GRANULOCYTES NFR BLD AUTO: 0.2 % — SIGNIFICANT CHANGE UP (ref 0–0.9)
LYMPHOCYTES # BLD AUTO: 1.42 K/UL — SIGNIFICANT CHANGE UP (ref 1–3.3)
LYMPHOCYTES # BLD AUTO: 16.7 % — SIGNIFICANT CHANGE UP (ref 13–44)
MCHC RBC-ENTMCNC: 28.7 PG — SIGNIFICANT CHANGE UP (ref 27–34)
MCHC RBC-ENTMCNC: 31.8 G/DL — LOW (ref 32–36)
MCV RBC AUTO: 90.4 FL — SIGNIFICANT CHANGE UP (ref 80–100)
MONOCYTES # BLD AUTO: 0.84 K/UL — SIGNIFICANT CHANGE UP (ref 0–0.9)
MONOCYTES NFR BLD AUTO: 9.9 % — SIGNIFICANT CHANGE UP (ref 2–14)
NEUTROPHILS # BLD AUTO: 6.06 K/UL — SIGNIFICANT CHANGE UP (ref 1.8–7.4)
NEUTROPHILS NFR BLD AUTO: 71.4 % — SIGNIFICANT CHANGE UP (ref 43–77)
NRBC # BLD: 0 /100 WBCS — SIGNIFICANT CHANGE UP (ref 0–0)
PLATELET # BLD AUTO: 287 K/UL — SIGNIFICANT CHANGE UP (ref 150–400)
POTASSIUM SERPL-MCNC: 4.8 MMOL/L — SIGNIFICANT CHANGE UP (ref 3.5–5.3)
POTASSIUM SERPL-SCNC: 4.8 MMOL/L — SIGNIFICANT CHANGE UP (ref 3.5–5.3)
PROT SERPL-MCNC: 7.4 GM/DL — SIGNIFICANT CHANGE UP (ref 6–8.3)
RBC # BLD: 4.25 M/UL — SIGNIFICANT CHANGE UP (ref 3.8–5.2)
RBC # FLD: 13.2 % — SIGNIFICANT CHANGE UP (ref 10.3–14.5)
SODIUM SERPL-SCNC: 142 MMOL/L — SIGNIFICANT CHANGE UP (ref 135–145)
WBC # BLD: 8.49 K/UL — SIGNIFICANT CHANGE UP (ref 3.8–10.5)
WBC # FLD AUTO: 8.49 K/UL — SIGNIFICANT CHANGE UP (ref 3.8–10.5)

## 2023-10-08 PROCEDURE — 99284 EMERGENCY DEPT VISIT MOD MDM: CPT

## 2023-10-08 PROCEDURE — 73502 X-RAY EXAM HIP UNI 2-3 VIEWS: CPT | Mod: 26,RT

## 2023-10-08 PROCEDURE — 73551 X-RAY EXAM OF FEMUR 1: CPT | Mod: 26,RT

## 2023-10-08 PROCEDURE — 72192 CT PELVIS W/O DYE: CPT | Mod: 26,MA

## 2023-10-08 RX ORDER — OXYCODONE HYDROCHLORIDE 5 MG/1
1 TABLET ORAL
Qty: 12 | Refills: 0
Start: 2023-10-08 | End: 2023-10-10

## 2023-10-08 NOTE — ED PROVIDER NOTE - OBJECTIVE STATEMENT
67 y/o F with PMH hyperthyroidism presenting to the ED c/o R hip pain. States the pain has been worsening x 3 days. No hx of fall or trauma. It is localized to the lateral aspect of the R hip. No radiation of pain. Denies back pain. No numbness/tingling/weakness. States pain is sharp, worse with bearing weight and ambulation. No hx of prior hip issues. States she tried ibuprofen with minimal relief of symptoms.

## 2023-10-08 NOTE — ED ADULT NURSE NOTE - NSICDXPASTMEDICALHX_GEN_ALL_CORE_FT
PAST MEDICAL HISTORY:  Hallux Valgus Right Foot     Thyroid Nodule     Uterine leiomyoma     Vertigo

## 2023-10-08 NOTE — ED PROVIDER NOTE - PHYSICAL EXAMINATION
GENERAL: Awake, alert, NAD  HEENT: NC/AT, moist mucous membranes  LUNGS: CTAB, no wheezes or crackles   CARDIAC: RRR, no m/r/g  ABDOMEN: Soft, non tender, non distended, no rebound, no guarding  BACK: No midline spinal tenderness, no CVA tenderness, tenderness to palpation of lateral aspect of R hip, able to range hip with mild pain, no significant swelling or redness  EXT: No edema, no calf tenderness, 2+ DP pulses bilaterally, no deformities.  NEURO: A&Ox3. Moving all extremities.  SKIN: Warm and dry. No rash.  PSYCH: Normal affect.

## 2023-10-08 NOTE — ED PROVIDER NOTE - CARE PROVIDER_API CALL
Cristino Castro  Orthopaedic Surgery  [address] [place] [address]  Phone: [phone]  Fax: [phone]  Follow Up Time: 1-3 Days

## 2023-10-08 NOTE — ED PROVIDER NOTE - CLINICAL SUMMARY MEDICAL DECISION MAKING FREE TEXT BOX
Anaya A Cindy called the clinic support line with the following:    States that she has a refill left of her medication, needs to change pharmacies.    Anaya White is requesting a refill of:    Pending Prescriptions:                       Disp   Refills    ondansetron (ZOFRAN ODT) 4 MG ODT tab     20 tab*0            Sig: Take 1 tablet (4 mg) by mouth every 8 hours as           needed for nausea    Please close encounter if RX was sent. Thanks, Liliana    
69 y/o F with PMH hyperthyroidism presenting to the ED c/o R hip pain.  Vitals stable.  She is well appearing in NAD.  Able to range R hip with some pain.  Concern for bony pathology given localized tenderness. Plan for XR and CT of R hip.  Will reassess following labs/imaging.  Treat pain.    CT with severe arthritis of R hip.  Will provide ortho f/u.  Will treat pain and provide low dose steroid for arthritis flare.

## 2023-10-08 NOTE — ED ADULT NURSE NOTE - NSICDXPASTSURGICALHX_GEN_ALL_CORE_FT
PAST SURGICAL HISTORY:  H/O abdominal hysterectomy 2001    S/P Bunionectomy Left Foot     S/P LASIK surgery

## 2023-10-08 NOTE — ED ADULT NURSE NOTE - NSFALLHARMRISKINTERV_ED_ALL_ED

## 2023-10-08 NOTE — ED ADULT NURSE NOTE - OBJECTIVE STATEMENT
patient alert and oriented x4. patient 69 yo female c/o right hip pain. denies fall, trauma. denies radiation, pain appears to be localized. denies back pain. describes pain as "sharp" rated 10/10. reports pain is worse with bearing weight and has difficulty ambulating. NKDA.

## 2023-10-08 NOTE — ED ADULT NURSE NOTE - NSICDXFAMILYHX_GEN_ALL_CORE_FT
FAMILY HISTORY:  Father  Still living? Unknown  Family history of lung cancer, Age at diagnosis: Age Unknown    Mother  Still living? Unknown  Family history of stroke, Age at diagnosis: Age Unknown    Sibling  Still living? Unknown  Family history of brain cancer, Age at diagnosis: Age Unknown

## 2023-10-08 NOTE — ED PROVIDER NOTE - NSFOLLOWUPINSTRUCTIONS_ED_ALL_ED_FT
You were seen in the ED and found to have significant arthritis of your right hip.    Follow up with orthopedics in the office.    Take medication as prescribed.    Rest and elevate your leg.    If you have new or worsening symptoms, please return to the ED.

## 2023-10-24 ENCOUNTER — APPOINTMENT (OUTPATIENT)
Dept: ORTHOPEDIC SURGERY | Facility: CLINIC | Age: 68
End: 2023-10-24
Payer: MEDICARE

## 2023-10-24 VITALS — BODY MASS INDEX: 31.18 KG/M2 | HEIGHT: 63 IN | WEIGHT: 176 LBS

## 2023-10-24 DIAGNOSIS — Z87.898 PERSONAL HISTORY OF OTHER SPECIFIED CONDITIONS: ICD-10-CM

## 2023-10-24 DIAGNOSIS — Z86.39 PERSONAL HISTORY OF OTHER ENDOCRINE, NUTRITIONAL AND METABOLIC DISEASE: ICD-10-CM

## 2023-10-24 PROCEDURE — 99204 OFFICE O/P NEW MOD 45 MIN: CPT

## 2023-10-24 RX ORDER — MELOXICAM 15 MG/1
15 TABLET ORAL
Qty: 30 | Refills: 1 | Status: ACTIVE | COMMUNITY
Start: 2023-10-24 | End: 1900-01-01

## 2023-12-05 ENCOUNTER — APPOINTMENT (OUTPATIENT)
Dept: ORTHOPEDIC SURGERY | Facility: CLINIC | Age: 68
End: 2023-12-05
Payer: MEDICARE

## 2023-12-05 VITALS — WEIGHT: 176 LBS | BODY MASS INDEX: 31.18 KG/M2 | HEIGHT: 63 IN

## 2023-12-05 DIAGNOSIS — M16.11 UNILATERAL PRIMARY OSTEOARTHRITIS, RIGHT HIP: ICD-10-CM

## 2023-12-05 PROCEDURE — 99213 OFFICE O/P EST LOW 20 MIN: CPT

## 2023-12-12 ENCOUNTER — APPOINTMENT (OUTPATIENT)
Dept: ORTHOPEDIC SURGERY | Facility: CLINIC | Age: 68
End: 2023-12-12
Payer: MEDICARE

## 2023-12-12 VITALS — BODY MASS INDEX: 31.18 KG/M2 | HEIGHT: 63 IN | WEIGHT: 176 LBS

## 2023-12-12 DIAGNOSIS — Z00.00 ENCOUNTER FOR GENERAL ADULT MEDICAL EXAMINATION W/OUT ABNORMAL FINDINGS: ICD-10-CM

## 2023-12-12 DIAGNOSIS — Z78.9 OTHER SPECIFIED HEALTH STATUS: ICD-10-CM

## 2023-12-12 DIAGNOSIS — M50.30 OTHER CERVICAL DISC DEGENERATION, UNSPECIFIED CERVICAL REGION: ICD-10-CM

## 2023-12-12 DIAGNOSIS — M75.52 BURSITIS OF LEFT SHOULDER: ICD-10-CM

## 2023-12-12 DIAGNOSIS — M54.12 RADICULOPATHY, CERVICAL REGION: ICD-10-CM

## 2023-12-12 DIAGNOSIS — M75.51 BURSITIS OF RIGHT SHOULDER: ICD-10-CM

## 2023-12-12 PROCEDURE — 99214 OFFICE O/P EST MOD 30 MIN: CPT

## 2023-12-12 PROCEDURE — 72040 X-RAY EXAM NECK SPINE 2-3 VW: CPT

## 2023-12-12 PROCEDURE — 73030 X-RAY EXAM OF SHOULDER: CPT | Mod: LT

## 2023-12-12 PROCEDURE — 73010 X-RAY EXAM OF SHOULDER BLADE: CPT | Mod: LT

## 2023-12-12 RX ORDER — METHYLPREDNISOLONE 4 MG/1
4 TABLET ORAL
Qty: 1 | Refills: 0 | Status: ACTIVE | COMMUNITY
Start: 2023-12-12 | End: 1900-01-01

## 2023-12-13 NOTE — PHYSICAL EXAM
[] : negative [place] [TWNoteComboBox7] : active forward flexion 160 degrees [de-identified] : active abduction 170 degrees [TWNoteComboBox6] : internal rotation L1 [de-identified] : external rotation at 90 degrees of abduction 90 degrees

## 2023-12-13 NOTE — HISTORY OF PRESENT ILLNESS
[de-identified] : 12/12/23: Mrs Shasha Riggs is a 67 y/o RHD F here c/o 3 weeks of non-traumatic left shoulder and neck pain. She has some difficulty and pain with overhead motion and intense pain when reaching behind her back. She has limited ROM overall. She states she has experienced distal N/T at night when laying on her left side. Her neck pain is intermittent she has not had any treatment for this issue outside of hot compresses and nsaids.  Occ: Retired Declined

## 2023-12-13 NOTE — DISCUSSION/SUMMARY
[de-identified] : 68f with cervical ddd, radiculopathy and bursitis of the left shoulder 1) MDP rx 2) start physical therapy 3) cryotherapy, rest and activity modification 4) rtc 4 weeks for re-eval   Entered by Deann Stoll acting as scribe. Dr. Perez- The documentation recorded by the scribe accurately reflects the service I personally performed and the decisions made by me.

## 2024-01-16 ENCOUNTER — APPOINTMENT (OUTPATIENT)
Dept: ORTHOPEDIC SURGERY | Facility: CLINIC | Age: 69
End: 2024-01-16

## 2024-08-15 ENCOUNTER — APPOINTMENT (OUTPATIENT)
Dept: OTOLARYNGOLOGY | Facility: CLINIC | Age: 69
End: 2024-08-15
Payer: MEDICARE

## 2024-08-15 VITALS
DIASTOLIC BLOOD PRESSURE: 82 MMHG | BODY MASS INDEX: 31.01 KG/M2 | SYSTOLIC BLOOD PRESSURE: 135 MMHG | HEIGHT: 63 IN | HEART RATE: 70 BPM | WEIGHT: 175 LBS

## 2024-08-15 DIAGNOSIS — E78.00 PURE HYPERCHOLESTEROLEMIA, UNSPECIFIED: ICD-10-CM

## 2024-08-15 DIAGNOSIS — Z84.89 FAMILY HISTORY OF OTHER SPECIFIED CONDITIONS: ICD-10-CM

## 2024-08-15 DIAGNOSIS — I10 ESSENTIAL (PRIMARY) HYPERTENSION: ICD-10-CM

## 2024-08-15 DIAGNOSIS — E04.1 NONTOXIC SINGLE THYROID NODULE: ICD-10-CM

## 2024-08-15 DIAGNOSIS — Z80.9 FAMILY HISTORY OF MALIGNANT NEOPLASM, UNSPECIFIED: ICD-10-CM

## 2024-08-15 DIAGNOSIS — M81.0 AGE-RELATED OSTEOPOROSIS W/OUT CURRENT PATHOLOGICAL FRACTURE: ICD-10-CM

## 2024-08-15 DIAGNOSIS — E05.90 THYROTOXICOSIS, UNSPECIFIED W/OUT THYROTOXIC CRISIS OR STORM: ICD-10-CM

## 2024-08-15 DIAGNOSIS — Z82.49 FAMILY HISTORY OF ISCHEMIC HEART DISEASE AND OTHER DISEASES OF THE CIRCULATORY SYSTEM: ICD-10-CM

## 2024-08-15 DIAGNOSIS — E21.3 HYPERPARATHYROIDISM, UNSPECIFIED: ICD-10-CM

## 2024-08-15 DIAGNOSIS — Z80.1 FAMILY HISTORY OF MALIGNANT NEOPLASM OF TRACHEA, BRONCHUS AND LUNG: ICD-10-CM

## 2024-08-15 PROCEDURE — 99204 OFFICE O/P NEW MOD 45 MIN: CPT

## 2024-08-15 RX ORDER — ALENDRONATE SODIUM 70 MG/1
70 TABLET ORAL
Refills: 0 | Status: ACTIVE | COMMUNITY

## 2024-08-15 RX ORDER — AMLODIPINE BESYLATE 2.5 MG/1
2.5 TABLET ORAL
Refills: 0 | Status: ACTIVE | COMMUNITY

## 2024-08-15 RX ORDER — PROPYLTHIOURACIL 50 MG/1
50 TABLET ORAL
Refills: 0 | Status: ACTIVE | COMMUNITY

## 2024-08-15 RX ORDER — HYDROCHLOROTHIAZIDE 12.5 MG/1
12.5 TABLET ORAL
Refills: 0 | Status: ACTIVE | COMMUNITY

## 2024-08-15 RX ORDER — ATORVASTATIN CALCIUM 10 MG/1
10 TABLET, FILM COATED ORAL
Refills: 0 | Status: ACTIVE | COMMUNITY

## 2024-08-15 NOTE — PHYSICAL EXAM
[Midline] : trachea located in midline position [Normal] : no rashes [de-identified] : L thyroid nodule

## 2024-08-15 NOTE — PLAN
[TextEntry] : - Patient with hyperthyroidism and hyperparathyroidism. - Discussed parathyroidectomy because of the osteoporosis. Since the patient has hyperthyroidism I recommended total thyroidectomy with L superior parathyroidectomy. US in the office today showed a 1.7 cm parathyroid on the L side. - Will request a 4D CT. - Return with results.

## 2024-08-15 NOTE — CONSULT LETTER
[Dear  ___] : Dear  [unfilled], [Consult Letter:] : I had the pleasure of evaluating your patient, [unfilled]. [Please see my note below.] : Please see my note below. [Consult Closing:] : Thank you very much for allowing me to participate in the care of this patient.  If you have any questions, please do not hesitate to contact me. [Sincerely,] : Sincerely, [FreeTextEntry2] : Dr. Romana Kanta [FreeTextEntry3] : Slade Cornell MD, FACS  Otolaryngology-Head and Neck Surgery [place] [employer] [facility]

## 2024-08-15 NOTE — HISTORY OF PRESENT ILLNESS
[de-identified] : 69 year old woman referred by Dr. Romana Kanta for initial evaluation of Hyperparathyroidism.  PMHx: Thyroid nodules- monitoring with Dr. Howard.   Currently taking PTU 100mg BID. Has been taking PTU since 2017, different dosages.  Per consult notes, FNA of Left lobe mass 4/13: adenoma Had previously seen Dr. Fuentes who recommended surgery for thyroid enlargement and tracheal deviation but pt did not want surgery.  Pt reports h/o high calcium in the last year.  Today pt denies neck/throat pain, dysphagia, dyspnea, dysphonia. No recent fever, chills, weight loss. Eating and drinking without issues.  Denies h/o kidney stones, bone fractures, abdominal pain, constipation.  labs July 2024 : calcium was 10.6H and PTH was 171H,  7/18/24: 24 hro urine calcium was 293H,  labs: calcium was 11.0H, PTH was 104H,  Not taking calcium supplements. Pt taking Alendronate 70mg.   NUCLEAR MEDICINE PARATHYROID IMAGING 6/11/2024 IMPRESSION:  This exam is consistent with a parathyroid adenoma adjacent to the upper pole the left lobe of the thyroid gland.   DEXA APPENDICULAR 5/7/2024 IMPRESSION: According to the WHO classification, the patient has osteoporosis.  ULTRASOUND RENAL 5/7/2024  IMPRESSION:  Negative for hydronephrosis or renal calculi.  US Thyroid done 06- FINDINGS:  ISTHMUS: Normal size in AP dimension measuring 0.2 cm.  RIGHT LOBE:  Measures 3.4 x 0.9 x 1.2 cm in sagittal x AP x transverse dimensions.  Volume 1.7 cc.            ARCHITECTURE: There is an upper pole isoechoic 1.1 x 1.0 x 0.9 cm nodule that is smaller than on prior exam.             COLOR DOPPLER:  Unremarkable.    LEFT LOBE:    Measures 6 x 3.5 x 2.9 cm in sagittal x AP x transverse dimensions. Volume 31.7 cc.            ARCHITECTURE: The left lobe is full of an isoechoic stable nodule measuring 5.3 x 3.2 x 2.8 cm.             COLOR DOPPLER:  Unremarkable.   IMPRESSION:  Stable large left nodule and decreased size of smaller right upper pole nodule.

## 2024-09-11 ENCOUNTER — APPOINTMENT (OUTPATIENT)
Dept: CT IMAGING | Facility: IMAGING CENTER | Age: 69
End: 2024-09-11

## 2024-09-11 ENCOUNTER — OUTPATIENT (OUTPATIENT)
Dept: OUTPATIENT SERVICES | Facility: HOSPITAL | Age: 69
LOS: 1 days | End: 2024-09-11
Payer: MEDICARE

## 2024-09-11 DIAGNOSIS — Z90.710 ACQUIRED ABSENCE OF BOTH CERVIX AND UTERUS: Chronic | ICD-10-CM

## 2024-09-11 DIAGNOSIS — Z98.890 OTHER SPECIFIED POSTPROCEDURAL STATES: Chronic | ICD-10-CM

## 2024-09-11 DIAGNOSIS — E04.1 NONTOXIC SINGLE THYROID NODULE: ICD-10-CM

## 2024-09-11 PROCEDURE — 70492 CT SFT TSUE NCK W/O & W/DYE: CPT

## 2024-09-11 PROCEDURE — 70492 CT SFT TSUE NCK W/O & W/DYE: CPT | Mod: 26,MH

## 2024-09-19 ENCOUNTER — APPOINTMENT (OUTPATIENT)
Dept: OTOLARYNGOLOGY | Facility: CLINIC | Age: 69
End: 2024-09-19
Payer: MEDICARE

## 2024-09-19 VITALS
WEIGHT: 175 LBS | OXYGEN SATURATION: 97 % | DIASTOLIC BLOOD PRESSURE: 75 MMHG | HEART RATE: 78 BPM | SYSTOLIC BLOOD PRESSURE: 114 MMHG | BODY MASS INDEX: 31.01 KG/M2 | HEIGHT: 63 IN

## 2024-09-19 DIAGNOSIS — E21.3 HYPERPARATHYROIDISM, UNSPECIFIED: ICD-10-CM

## 2024-09-19 DIAGNOSIS — I10 ESSENTIAL (PRIMARY) HYPERTENSION: ICD-10-CM

## 2024-09-19 PROCEDURE — 31575 DIAGNOSTIC LARYNGOSCOPY: CPT

## 2024-09-19 PROCEDURE — 99214 OFFICE O/P EST MOD 30 MIN: CPT | Mod: 25

## 2024-09-19 NOTE — CONSULT LETTER
[Dear  ___] : Dear  [unfilled], [Consult Letter:] : I had the pleasure of evaluating your patient, [unfilled]. [Please see my note below.] : Please see my note below. [Consult Closing:] : Thank you very much for allowing me to participate in the care of this patient.  If you have any questions, please do not hesitate to contact me. [Sincerely,] : Sincerely, [FreeTextEntry2] : Dr. Romana Kanta [FreeTextEntry3] : Slade Cornell MD, FACS  Otolaryngology-Head and Neck Surgery [place] [employer] at [facility]

## 2024-09-19 NOTE — PLAN
[TextEntry] : - Patient with hyperthyroidism and hyperparathyroidism. - Discussed parathyroidectomy because of the osteoporosis. Since the patient has hyperthyroidism I recommended total thyroidectomy with L superior parathyroidectomy. US in the office today showed a 1.7 cm parathyroid on the L side. - 4D CT showed a L superior parathyroid.  - The patient was informed of the procedure, risks, benefits and alternative. The patient was given time to ask questions and agreed to proceed with the procedure. The major and more common complications discussed were injury to recurrent laryngeal nerve, hypocalcemia, airway complications, poor wound healing, infection, fistula, scar, deformity, bleeding, vascular injury, numbness, fluid collection, recurrent tumor, tumor spread and/or potential for unexpected complications. The risk of anesthesia (MI, CVA, death, airway complications, other organ injury and other potential complications) were also discussed. Tumor recurrence and metastasis was discussed.

## 2024-09-19 NOTE — REASON FOR VISIT
[Subsequent Evaluation] : a subsequent evaluation for [FreeTextEntry2] : hyperthyroidism and hyperparathyroidism.

## 2024-09-19 NOTE — PHYSICAL EXAM
[Midline] : trachea located in midline position [Normal] : no rashes [de-identified] : L thyroid nodule

## 2024-09-19 NOTE — HISTORY OF PRESENT ILLNESS
[de-identified] : 69 year old woman referred by Dr. Romana Kanta for initial evaluation of Hyperparathyroidism. Presents presents for follow-up s/p 4D CT scan. PMHx: Thyroid nodules- monitoring with Dr. Howard.   Labs July 2024 : calcium was 10.6H and PTH was 171H,  7/18/24: 24 hro urine calcium was 293H Had previously seen Dr. Fuentes who recommended surgery for thyroid enlargement and tracheal deviation but pt did not want surgery. Currently taking PTU 100mg BID. Has been taking PTU since 2017, different dosages.  Per consult notes, FNA of Left lobe mass 4/13: adenoma Reports she is doing well since last visit. Patient denies throat/neck pain, globus sensation, dysphagia, odynophagia, dyspnea, dysphonia, hemoptysis or otalgia. Denies recent fevers/infections, chills, night sweats, weight loss.  Denies h/o kidney stones, bone fractures, abdominal pain, constipation.  CT 4D Parathyroid w/ IV Cont IMPRESSION: There is a 0.6 x 1.1 x 1.3 cm hypodense nodule, located along the left lateral esophagus, deep to the left thyroid upper pole (4-57, 605-84). It demonstrates arterial phase enhancement with delayed phase washout and is associated with a polar vessel consistent with parathyroid adenoma.

## 2024-10-15 ENCOUNTER — OUTPATIENT (OUTPATIENT)
Dept: OUTPATIENT SERVICES | Facility: HOSPITAL | Age: 69
LOS: 1 days | End: 2024-10-15

## 2024-10-15 VITALS
SYSTOLIC BLOOD PRESSURE: 129 MMHG | TEMPERATURE: 98 F | HEIGHT: 63.5 IN | OXYGEN SATURATION: 98 % | HEART RATE: 78 BPM | DIASTOLIC BLOOD PRESSURE: 77 MMHG | WEIGHT: 175.93 LBS | RESPIRATION RATE: 16 BRPM

## 2024-10-15 DIAGNOSIS — Z86.39 PERSONAL HISTORY OF OTHER ENDOCRINE, NUTRITIONAL AND METABOLIC DISEASE: ICD-10-CM

## 2024-10-15 DIAGNOSIS — E21.3 HYPERPARATHYROIDISM, UNSPECIFIED: ICD-10-CM

## 2024-10-15 DIAGNOSIS — Z90.710 ACQUIRED ABSENCE OF BOTH CERVIX AND UTERUS: Chronic | ICD-10-CM

## 2024-10-15 DIAGNOSIS — Z98.890 OTHER SPECIFIED POSTPROCEDURAL STATES: Chronic | ICD-10-CM

## 2024-10-15 DIAGNOSIS — I10 ESSENTIAL (PRIMARY) HYPERTENSION: ICD-10-CM

## 2024-10-15 DIAGNOSIS — E05.90 THYROTOXICOSIS, UNSPECIFIED WITHOUT THYROTOXIC CRISIS OR STORM: ICD-10-CM

## 2024-10-15 NOTE — H&P PST ADULT - ENMT COMMENTS
FROM of neck; Denies loose teeth, Partial upper and lower bridges FROM of neck; Denies loose teeth, Partial upper and lower bridges; denies paresthesia

## 2024-10-15 NOTE — H&P PST ADULT - RESPIRATORY AND THORAX COMMENTS
reports cough when laying flat, subsides when propped up , per surgeon note history of thyroid enlargement and  tracheal deviation

## 2024-10-15 NOTE — H&P PST ADULT - HISTORY OF PRESENT ILLNESS
69 yr old female with history of elevated calcium presents for PST evaluation for scheduled for parathyroidectomy with PTH assay and total thyroidectomy , NIMs monitor. Patient has history of goiter with tracheal deviation, she does report a cough when laying down for past few months, denies muscle pain, kidney stones, constipation 69 yr old female with history of elevated calcium and  history of goiter with tracheal deviation, she does report a new cough when laying down for past few months, denies muscle pain, kidney stones, constipation; TFTs within normal range monitored by endo; presents for PST evaluation for scheduled for parathyroidectomy with PTH assay and total thyroidectomy , NIMs monitor.

## 2024-10-15 NOTE — H&P PST ADULT - PROBLEM SELECTOR PLAN 1
Patient scheduled for surgery on: 10/23/24  Provided with verbal and written presurgical instructions  Verbalized understanding  with teach back on the following: Famotidine for GI prophylaxis and chlorhexidine wash    Labs completed and reviewed by PCP

## 2024-10-15 NOTE — H&P PST ADULT - EKG AND INTERPRETATION
Adherence to aseptic technique: hand hygiene prior to donning barriers (gown, gloves), don cap and mask, sterile drape over patient
with PCP for preop eval

## 2024-10-15 NOTE — H&P PST ADULT - NSICDXPASTMEDICALHX_GEN_ALL_CORE_FT
PAST MEDICAL HISTORY:  H/O hyperparathyroidism     Hallux Valgus Right Foot     Hyperthyroidism     Thyroid Nodule     Tracheal deviation     Uterine leiomyoma     Vertigo

## 2024-10-22 NOTE — ASU PATIENT PROFILE, ADULT - FALL HARM RISK - UNIVERSAL INTERVENTIONS
Bed in lowest position, wheels locked, appropriate side rails in place/Call bell, personal items and telephone in reach/Instruct patient to call for assistance before getting out of bed or chair/Non-slip footwear when patient is out of bed/[place] to call system/Physically safe environment - no spills, clutter or unnecessary equipment/Purposeful Proactive Rounding/Room/bathroom lighting operational, light cord in reach

## 2024-10-23 ENCOUNTER — TRANSCRIPTION ENCOUNTER (OUTPATIENT)
Age: 69
End: 2024-10-23

## 2024-10-23 ENCOUNTER — INPATIENT (INPATIENT)
Facility: HOSPITAL | Age: 69
LOS: 1 days | Discharge: HOME | End: 2024-10-25
Attending: OTOLARYNGOLOGY | Admitting: OTOLARYNGOLOGY
Payer: MEDICARE

## 2024-10-23 ENCOUNTER — RESULT REVIEW (OUTPATIENT)
Age: 69
End: 2024-10-23

## 2024-10-23 ENCOUNTER — APPOINTMENT (OUTPATIENT)
Dept: OTOLARYNGOLOGY | Facility: HOSPITAL | Age: 69
End: 2024-10-23

## 2024-10-23 VITALS
TEMPERATURE: 98 F | DIASTOLIC BLOOD PRESSURE: 84 MMHG | WEIGHT: 175.05 LBS | RESPIRATION RATE: 18 BRPM | SYSTOLIC BLOOD PRESSURE: 129 MMHG | OXYGEN SATURATION: 100 % | HEIGHT: 63 IN | HEART RATE: 81 BPM

## 2024-10-23 DIAGNOSIS — E21.3 HYPERPARATHYROIDISM, UNSPECIFIED: ICD-10-CM

## 2024-10-23 DIAGNOSIS — Z98.890 OTHER SPECIFIED POSTPROCEDURAL STATES: Chronic | ICD-10-CM

## 2024-10-23 DIAGNOSIS — Z90.710 ACQUIRED ABSENCE OF BOTH CERVIX AND UTERUS: Chronic | ICD-10-CM

## 2024-10-23 LAB
CALCIUM SERPL-MCNC: 9.6 MG/DL — SIGNIFICANT CHANGE UP (ref 8.4–10.5)
CALCIUM SERPL-MCNC: 9.8 MG/DL — SIGNIFICANT CHANGE UP (ref 8.4–10.5)
PTH INTACT, INTRAOP SPECIMEN 2: SIGNIFICANT CHANGE UP
PTH INTACT, INTRAOP SPECIMEN 3: SIGNIFICANT CHANGE UP
PTH INTACT, INTRAOP TIMING 2: SIGNIFICANT CHANGE UP
PTH INTACT, INTRAOP TIMING 3: SIGNIFICANT CHANGE UP
PTH INTACT, INTRAOPERATIVE 2: 37 PG/ML — SIGNIFICANT CHANGE UP (ref 15–65)
PTH INTACT, INTRAOPERATIVE 3: 28 PG/ML — SIGNIFICANT CHANGE UP (ref 15–65)
PTH-INTACT FLD-MCNC: 12 PG/ML — LOW (ref 15–65)
PTH-INTACT IO % DIF SERPL: 293 PG/ML — HIGH (ref 15–65)

## 2024-10-23 PROCEDURE — 88331 PATH CONSLTJ SURG 1 BLK 1SPC: CPT | Mod: 26

## 2024-10-23 PROCEDURE — 88307 TISSUE EXAM BY PATHOLOGIST: CPT | Mod: 26

## 2024-10-23 PROCEDURE — 88305 TISSUE EXAM BY PATHOLOGIST: CPT | Mod: 26

## 2024-10-23 PROCEDURE — 60240 REMOVAL OF THYROID: CPT

## 2024-10-23 PROCEDURE — 60500 EXPLORE PARATHYROID GLANDS: CPT | Mod: 59

## 2024-10-23 DEVICE — LIGATING CLIPS WECK HORIZON SMALL-WIDE (RED) 24: Type: IMPLANTABLE DEVICE | Status: FUNCTIONAL

## 2024-10-23 DEVICE — LIGATING CLIPS WECK HORIZON MEDIUM (BLUE) 24: Type: IMPLANTABLE DEVICE | Status: FUNCTIONAL

## 2024-10-23 DEVICE — SURGICEL 2 X 14": Type: IMPLANTABLE DEVICE | Status: FUNCTIONAL

## 2024-10-23 RX ORDER — HEPARIN SODIUM 10000 [USP'U]/ML
5000 INJECTION INTRAVENOUS; SUBCUTANEOUS EVERY 12 HOURS
Refills: 0 | Status: DISCONTINUED | OUTPATIENT
Start: 2024-10-23 | End: 2024-10-25

## 2024-10-23 RX ORDER — AMLODIPINE BESYLATE 5 MG
1 TABLET ORAL
Refills: 0 | DISCHARGE

## 2024-10-23 RX ORDER — IBUPROFEN 200 MG
400 TABLET ORAL EVERY 6 HOURS
Refills: 0 | Status: DISCONTINUED | OUTPATIENT
Start: 2024-10-23 | End: 2024-10-25

## 2024-10-23 RX ORDER — HYDROMORPHONE HCL/0.9% NACL/PF 6 MG/30 ML
0.25 PATIENT CONTROLLED ANALGESIA SYRINGE INTRAVENOUS
Refills: 0 | Status: DISCONTINUED | OUTPATIENT
Start: 2024-10-23 | End: 2024-10-23

## 2024-10-23 RX ORDER — POLYETHYLENE GLYCOL 3350 17 G/17G
17 POWDER, FOR SOLUTION ORAL DAILY
Refills: 0 | Status: DISCONTINUED | OUTPATIENT
Start: 2024-10-23 | End: 2024-10-25

## 2024-10-23 RX ORDER — LEVOTHYROXINE SODIUM 88 MCG
125 TABLET ORAL
Refills: 0 | Status: DISCONTINUED | OUTPATIENT
Start: 2024-10-24 | End: 2024-10-25

## 2024-10-23 RX ORDER — ACETAMINOPHEN 500 MG
975 TABLET ORAL EVERY 6 HOURS
Refills: 0 | Status: DISCONTINUED | OUTPATIENT
Start: 2024-10-23 | End: 2024-10-25

## 2024-10-23 RX ORDER — CALCIUM CARBONATE 400(1000)
1 TABLET,CHEWABLE ORAL EVERY 8 HOURS
Refills: 0 | Status: DISCONTINUED | OUTPATIENT
Start: 2024-10-23 | End: 2024-10-24

## 2024-10-23 RX ORDER — INFLUENZ VIR VAC TV P-SURF2003 15MCG/.5ML
0.5 SYRINGE (ML) INTRAMUSCULAR ONCE
Refills: 0 | Status: DISCONTINUED | OUTPATIENT
Start: 2024-10-23 | End: 2024-10-25

## 2024-10-23 RX ORDER — SENNA 187 MG
1 TABLET ORAL AT BEDTIME
Refills: 0 | Status: DISCONTINUED | OUTPATIENT
Start: 2024-10-23 | End: 2024-10-25

## 2024-10-23 RX ORDER — CALCITRIOL 0.25 UG/1
0.25 CAPSULE, LIQUID FILLED ORAL
Refills: 0 | Status: DISCONTINUED | OUTPATIENT
Start: 2024-10-23 | End: 2024-10-24

## 2024-10-23 RX ORDER — ATORVASTATIN CALCIUM 10 MG/1
1 TABLET, FILM COATED ORAL
Refills: 0 | DISCHARGE

## 2024-10-23 RX ORDER — OXYCODONE HYDROCHLORIDE 30 MG/1
5 TABLET ORAL EVERY 6 HOURS
Refills: 0 | Status: DISCONTINUED | OUTPATIENT
Start: 2024-10-23 | End: 2024-10-25

## 2024-10-23 RX ORDER — HYDROMORPHONE HCL/0.9% NACL/PF 6 MG/30 ML
0.5 PATIENT CONTROLLED ANALGESIA SYRINGE INTRAVENOUS
Refills: 0 | Status: DISCONTINUED | OUTPATIENT
Start: 2024-10-23 | End: 2024-10-23

## 2024-10-23 RX ORDER — B-COMPLEX WITH VITAMIN C
1 VIAL (ML) INJECTION DAILY
Refills: 0 | Status: DISCONTINUED | OUTPATIENT
Start: 2024-10-23 | End: 2024-10-25

## 2024-10-23 RX ORDER — AMLODIPINE BESYLATE 10 MG
2.5 TABLET ORAL DAILY
Refills: 0 | Status: DISCONTINUED | OUTPATIENT
Start: 2024-10-23 | End: 2024-10-25

## 2024-10-23 RX ORDER — ONDANSETRON HYDROCHLORIDE 2 MG/ML
4 INJECTION, SOLUTION INTRAMUSCULAR; INTRAVENOUS ONCE
Refills: 0 | Status: DISCONTINUED | OUTPATIENT
Start: 2024-10-23 | End: 2024-10-23

## 2024-10-23 RX ORDER — MULTI VITAMIN/MINERAL SUPPLEMENT WITH ASCORBIC ACID, NIACIN, PYRIDOXINE, PANTOTHENIC ACID, FOLIC ACID, RIBOFLAVIN, THIAMIN, BIOTIN, COBALAMIN AND ZINC. 60; 20; 12.5; 10; 10; 1.7; 1.5; 1; .3; .006 MG/1; MG/1; MG/1; MG/1; MG/1; MG/1; MG/1; MG/1; MG/1; MG/1
1 TABLET, COATED ORAL
Refills: 0 | DISCHARGE

## 2024-10-23 RX ADMIN — Medication 30 MILLILITER(S): at 07:24

## 2024-10-23 RX ADMIN — CALCITRIOL 0.25 MICROGRAM(S): 0.25 CAPSULE, LIQUID FILLED ORAL at 18:38

## 2024-10-23 RX ADMIN — Medication 10 MILLIGRAM(S): at 22:09

## 2024-10-23 RX ADMIN — HEPARIN SODIUM 5000 UNIT(S): 10000 INJECTION INTRAVENOUS; SUBCUTANEOUS at 22:09

## 2024-10-23 RX ADMIN — Medication 1 TABLET(S): at 22:09

## 2024-10-23 NOTE — PATIENT PROFILE ADULT - FALL HARM RISK - RISK INTERVENTIONS
Assistance OOB with selected safe patient handling equipment/Assistance with ambulation/Communicate Fall Risk and Risk Factors to all staff, patient, and family/Monitor gait and stability/Reinforce activity limits and safety measures with patient and family/Sit up slowly, dangle for a short time, stand at bedside before walking/Use of alarms - bed, chair and/or voice tab/Visual Cue: Yellow wristband/Bed in lowest position, wheels locked, appropriate side rails in place/Call bell, personal items and telephone in reach/Instruct patient to call for assistance before getting out of bed or chair/Non-slip footwear when patient is out of bed/[place] to call system/Physically safe environment - no spills, clutter or unnecessary equipment/Purposeful Proactive Rounding/Room/bathroom lighting operational, light cord in reach

## 2024-10-23 NOTE — PATIENT PROFILE ADULT - NSPROPTRIGHTSUPPORTPERSON_GEN_A_NUR
SBAR IN Report: Mother    Verbal report received from [facility], RN on this patient, who is now being transferred from L&D for routine progression of care. The patient is wearing a green \"Anesthesia-Duramorph\" band. Report consisted of patient's Situation, Background, Assessment and Recommendations (SBAR). [place] ID bands were compared with the identification form, and verified with the patient and transferring nurse. Information from the SBAR and the [place] Report was reviewed with the transferring nurse; opportunity for questions and clarification provided. same name as above

## 2024-10-23 NOTE — BRIEF OPERATIVE NOTE - NSICDXBRIEFPROCEDURE_GEN_ALL_CORE_FT
PROCEDURES:  Total thyroidectomy 23-Oct-2024 12:31:58  Megan Dowd  Left superior parathyroidectomy 23-Oct-2024 12:32:13  Megan Dowd

## 2024-10-23 NOTE — PATIENT PROFILE ADULT - FUNCTIONAL SCREEN CURRENT LEVEL: COMMUNICATION, MLM
Patient just had repeat urine test which was negative for blood   0 = understands/communicates without difficulty

## 2024-10-24 LAB
ANION GAP SERPL CALC-SCNC: 11 MMOL/L — SIGNIFICANT CHANGE UP (ref 7–14)
BUN SERPL-MCNC: 27 MG/DL — HIGH (ref 7–23)
CALCIUM SERPL-MCNC: 8.7 MG/DL — SIGNIFICANT CHANGE UP (ref 8.4–10.5)
CALCIUM SERPL-MCNC: 8.9 MG/DL — SIGNIFICANT CHANGE UP (ref 8.4–10.5)
CALCIUM SERPL-MCNC: 9 MG/DL — SIGNIFICANT CHANGE UP (ref 8.4–10.5)
CALCIUM SERPL-MCNC: 9.2 MG/DL — SIGNIFICANT CHANGE UP (ref 8.4–10.5)
CALCIUM SERPL-MCNC: 9.2 MG/DL — SIGNIFICANT CHANGE UP (ref 8.4–10.5)
CHLORIDE SERPL-SCNC: 107 MMOL/L — SIGNIFICANT CHANGE UP (ref 98–107)
CO2 SERPL-SCNC: 24 MMOL/L — SIGNIFICANT CHANGE UP (ref 22–31)
CREAT SERPL-MCNC: 1.12 MG/DL — SIGNIFICANT CHANGE UP (ref 0.5–1.3)
EGFR: 53 ML/MIN/1.73M2 — LOW
GLUCOSE SERPL-MCNC: 99 MG/DL — SIGNIFICANT CHANGE UP (ref 70–99)
HCT VFR BLD CALC: 36.3 % — SIGNIFICANT CHANGE UP (ref 34.5–45)
HGB BLD-MCNC: 11.4 G/DL — LOW (ref 11.5–15.5)
MAGNESIUM SERPL-MCNC: 1.9 MG/DL — SIGNIFICANT CHANGE UP (ref 1.6–2.6)
MCHC RBC-ENTMCNC: 29.4 PG — SIGNIFICANT CHANGE UP (ref 27–34)
MCHC RBC-ENTMCNC: 31.4 GM/DL — LOW (ref 32–36)
MCV RBC AUTO: 93.6 FL — SIGNIFICANT CHANGE UP (ref 80–100)
NRBC # BLD: 0 /100 WBCS — SIGNIFICANT CHANGE UP (ref 0–0)
NRBC # FLD: 0 K/UL — SIGNIFICANT CHANGE UP (ref 0–0)
PHOSPHATE SERPL-MCNC: 3.7 MG/DL — SIGNIFICANT CHANGE UP (ref 2.5–4.5)
PLATELET # BLD AUTO: 268 K/UL — SIGNIFICANT CHANGE UP (ref 150–400)
POTASSIUM SERPL-MCNC: 4.3 MMOL/L — SIGNIFICANT CHANGE UP (ref 3.5–5.3)
POTASSIUM SERPL-SCNC: 4.3 MMOL/L — SIGNIFICANT CHANGE UP (ref 3.5–5.3)
RBC # BLD: 3.88 M/UL — SIGNIFICANT CHANGE UP (ref 3.8–5.2)
RBC # FLD: 13.8 % — SIGNIFICANT CHANGE UP (ref 10.3–14.5)
SODIUM SERPL-SCNC: 142 MMOL/L — SIGNIFICANT CHANGE UP (ref 135–145)
WBC # BLD: 10.9 K/UL — HIGH (ref 3.8–10.5)
WBC # FLD AUTO: 10.9 K/UL — HIGH (ref 3.8–10.5)

## 2024-10-24 RX ORDER — CALCIUM CARBONATE 400(1000)
2 TABLET,CHEWABLE ORAL THREE TIMES A DAY
Refills: 0 | Status: DISCONTINUED | OUTPATIENT
Start: 2024-10-24 | End: 2024-10-25

## 2024-10-24 RX ORDER — CALCIUM CARBONATE 400(1000)
1 TABLET,CHEWABLE ORAL ONCE
Refills: 0 | Status: COMPLETED | OUTPATIENT
Start: 2024-10-24 | End: 2024-10-24

## 2024-10-24 RX ORDER — LEVOTHYROXINE SODIUM 88 MCG
1 TABLET ORAL
Qty: 30 | Refills: 0
Start: 2024-10-24 | End: 2024-11-22

## 2024-10-24 RX ORDER — CALCITRIOL 0.25 UG/1
1 CAPSULE, LIQUID FILLED ORAL
Qty: 60 | Refills: 0
Start: 2024-10-24 | End: 2024-11-22

## 2024-10-24 RX ORDER — CALCITRIOL 0.25 UG/1
0.5 CAPSULE, LIQUID FILLED ORAL
Refills: 0 | Status: DISCONTINUED | OUTPATIENT
Start: 2024-10-24 | End: 2024-10-25

## 2024-10-24 RX ORDER — OXYCODONE HYDROCHLORIDE 30 MG/1
1 TABLET ORAL
Qty: 16 | Refills: 0
Start: 2024-10-24 | End: 2024-10-27

## 2024-10-24 RX ORDER — SENNA 187 MG
1 TABLET ORAL
Qty: 5 | Refills: 0
Start: 2024-10-24 | End: 2024-10-28

## 2024-10-24 RX ORDER — CALCIUM CARBONATE 400(1000)
1 TABLET,CHEWABLE ORAL
Qty: 90 | Refills: 0
Start: 2024-10-24 | End: 2024-11-22

## 2024-10-24 RX ADMIN — Medication 1 TABLET(S): at 14:57

## 2024-10-24 RX ADMIN — CALCITRIOL 0.5 MICROGRAM(S): 0.25 CAPSULE, LIQUID FILLED ORAL at 19:05

## 2024-10-24 RX ADMIN — Medication 2 TABLET(S): at 21:01

## 2024-10-24 RX ADMIN — CALCITRIOL 0.25 MICROGRAM(S): 0.25 CAPSULE, LIQUID FILLED ORAL at 07:02

## 2024-10-24 RX ADMIN — Medication 125 MICROGRAM(S): at 04:50

## 2024-10-24 RX ADMIN — Medication 1 TABLET(S): at 12:47

## 2024-10-24 RX ADMIN — Medication 10 MILLIGRAM(S): at 21:02

## 2024-10-24 RX ADMIN — HEPARIN SODIUM 5000 UNIT(S): 10000 INJECTION INTRAVENOUS; SUBCUTANEOUS at 12:47

## 2024-10-24 RX ADMIN — Medication 1 TABLET(S): at 07:03

## 2024-10-24 RX ADMIN — Medication 2.5 MILLIGRAM(S): at 07:03

## 2024-10-24 NOTE — PROGRESS NOTE ADULT - ASSESSMENT
Assessment and Plan:  ISAAC MELENDEZ is a  69yFemale with PMH Graves, L superior parathyroid adenoma now s/p total thyroidectomy, left superior parathyroidectomy 10/23.    PLAN:  - regular diet  - monitor LISANDRA, possible removal  - continue to trend calcium this morning and additional this afternoon. If stable this afternoon, possible discharge  - continue calcium supplementation  - continue home medication  - pain medication PRN    Page ENT with any questions/concerns.  Peds Page #[id number]  Adult Page #[phone]    Miriam Bauman  10-24-24 @ 06:50

## 2024-10-24 NOTE — DISCHARGE NOTE PROVIDER - NSDCMRMEDTOKEN_GEN_ALL_CORE_FT
amLODIPine 2.5 mg oral tablet: 1 tab(s) orally once a day  atorvastatin 10 mg oral tablet: 1 tab(s) orally once a day (at bedtime)  calcitriol 0.25 mcg oral capsule: 1 cap(s) orally 2 times a day  calcium carbonate 1250 mg (500 mg elemental calcium) oral tablet: 1 tab(s) orally every 8 hours  levothyroxine 125 mcg (0.125 mg) oral tablet: 1 tab(s) orally once a day (before a meal)  multivitamin: 1 tab(s) orally once a day  oxyCODONE 5 mg oral tablet: 1 tab(s) orally every 6 hours as needed for Severe Pain (7 - 10) MDD: 4  propylthiouracil 50 mg oral tablet: 2 tab(s) orally 2 times a day  senna leaf extract oral tablet: 1 tab(s) orally once a day (at bedtime)   amLODIPine 2.5 mg oral tablet: 1 tab(s) orally once a day  atorvastatin 10 mg oral tablet: 1 tab(s) orally once a day (at bedtime)  calcitriol 0.5 mcg oral capsule: 1 cap(s) orally 2 times a day  calcium carbonate 1250 mg (500 mg elemental calcium) oral tablet: 2 tab(s) orally every 8 hours  levothyroxine 125 mcg (0.125 mg) oral tablet: 1 tab(s) orally once a day (before a meal)  multivitamin: 1 tab(s) orally once a day  oxyCODONE 5 mg oral tablet: 1 tab(s) orally every 6 hours as needed for Severe Pain (7 - 10) MDD: 4  senna leaf extract oral tablet: 1 tab(s) orally once a day (at bedtime)

## 2024-10-24 NOTE — PROVIDER CONTACT NOTE (OTHER) - BACKGROUND
Pt is a 69y/f, pmhx of hyperthyroidism and thyroid nodule, s/p total thyroidectomy and l parathyroidectomy

## 2024-10-24 NOTE — DISCHARGE NOTE PROVIDER - NSDCFUSCHEDAPPT_GEN_ALL_CORE_FT
Slade Cornell  [facility]  OTOLARYNG [address] [facility]  Scheduled Appointment: 11/14/2024    Augusta Shah  [facility]  ENDOCRIN [address] [place] [address]  Scheduled Appointment: 01/07/2025

## 2024-10-24 NOTE — DISCHARGE NOTE PROVIDER - CARE PROVIDER_API CALL
Slade Cornell [facility]  Otolaryngology  [address] [place] [id number]  Phone: [phone]  Fax: [phone]  Follow Up Time:

## 2024-10-24 NOTE — DISCHARGE NOTE PROVIDER - HOSPITAL COURSE
S/P hyroidectomy 10/23/24. Calcium levels were trended and patient was started on Calcium and rocaltrol. Patient was cleared for discharge on 10/24/24. S/P thyroidectomy 10/23/24. Calcium levels were trended and patient was started on Calcium and rocaltrol. Patient was cleared for discharge on 10/25/24.

## 2024-10-24 NOTE — DISCHARGE NOTE PROVIDER - NSDCFUADDINST_GEN_ALL_CORE_FT
Wound Care Instructions:  -Keep the dressing for at least 48 hours as it is important to keep the incision clean and dry for at least 48 hours  -Keep your incision dry and clean for the first 48h after surgery; after those 48h, you may let clean, soapy water run over your incision but do not rub and do not remove the steri-strips (those will be removed in the office)  -You may advance your diet as tolerated  -No heavy lifting for 1 month after surgery  -If you develop symptoms of hypocalcemia (tingling/numbness in the extremities or around the mouth; muscle cramps) you should take 2 Calcium pills and call the office to speak to a physician at [phone]. Come to the ED if you are unable to reach any physician.  -Come to the ED/seek medical attention if you develop trouble breathing, progressive and significant neck swelling, drainage/bleeding from the surgical site, fevers > 101, or if you have any other concerns     Medication Changes:  -Take your medication as prescribed: you are now on calcium supplements and thyroid hormone supplementation -it is important that you take the medication every day as prescribed and to continue to do so consistently unless instructed otherwise by your endocrinologist or       Follow-up appointment:  -Please call [phone] to confirm your post-op appointment.

## 2024-10-25 ENCOUNTER — TRANSCRIPTION ENCOUNTER (OUTPATIENT)
Age: 69
End: 2024-10-25

## 2024-10-25 VITALS
OXYGEN SATURATION: 98 % | SYSTOLIC BLOOD PRESSURE: 118 MMHG | TEMPERATURE: 98 F | DIASTOLIC BLOOD PRESSURE: 81 MMHG | HEART RATE: 82 BPM | RESPIRATION RATE: 18 BRPM

## 2024-10-25 LAB
ALBUMIN SERPL ELPH-MCNC: 3.8 G/DL — SIGNIFICANT CHANGE UP (ref 3.3–5)
ALP SERPL-CCNC: 67 U/L — SIGNIFICANT CHANGE UP (ref 40–120)
ALT FLD-CCNC: 19 U/L — SIGNIFICANT CHANGE UP (ref 4–33)
ANION GAP SERPL CALC-SCNC: 11 MMOL/L — SIGNIFICANT CHANGE UP (ref 7–14)
AST SERPL-CCNC: 26 U/L — SIGNIFICANT CHANGE UP (ref 4–32)
BILIRUB SERPL-MCNC: 0.5 MG/DL — SIGNIFICANT CHANGE UP (ref 0.2–1.2)
BUN SERPL-MCNC: 19 MG/DL — SIGNIFICANT CHANGE UP (ref 7–23)
CALCIUM SERPL-MCNC: 8.9 MG/DL — SIGNIFICANT CHANGE UP (ref 8.4–10.5)
CALCIUM SERPL-MCNC: 9.1 MG/DL — SIGNIFICANT CHANGE UP (ref 8.4–10.5)
CALCIUM SERPL-MCNC: 9.1 MG/DL — SIGNIFICANT CHANGE UP (ref 8.4–10.5)
CHLORIDE SERPL-SCNC: 105 MMOL/L — SIGNIFICANT CHANGE UP (ref 98–107)
CO2 SERPL-SCNC: 25 MMOL/L — SIGNIFICANT CHANGE UP (ref 22–31)
CREAT SERPL-MCNC: 0.83 MG/DL — SIGNIFICANT CHANGE UP (ref 0.5–1.3)
EGFR: 76 ML/MIN/1.73M2 — SIGNIFICANT CHANGE UP
GLUCOSE SERPL-MCNC: 116 MG/DL — HIGH (ref 70–99)
MAGNESIUM SERPL-MCNC: 1.7 MG/DL — SIGNIFICANT CHANGE UP (ref 1.6–2.6)
PHOSPHATE SERPL-MCNC: 3.1 MG/DL — SIGNIFICANT CHANGE UP (ref 2.5–4.5)
POTASSIUM SERPL-MCNC: 4.3 MMOL/L — SIGNIFICANT CHANGE UP (ref 3.5–5.3)
POTASSIUM SERPL-SCNC: 4.3 MMOL/L — SIGNIFICANT CHANGE UP (ref 3.5–5.3)
PROT SERPL-MCNC: 6.9 G/DL — SIGNIFICANT CHANGE UP (ref 6–8.3)
SODIUM SERPL-SCNC: 141 MMOL/L — SIGNIFICANT CHANGE UP (ref 135–145)

## 2024-10-25 RX ORDER — CALCITRIOL 0.25 UG/1
1 CAPSULE, LIQUID FILLED ORAL
Qty: 180 | Refills: 0
Start: 2024-10-25 | End: 2025-01-22

## 2024-10-25 RX ORDER — CALCIUM CARBONATE 400(1000)
2 TABLET,CHEWABLE ORAL
Qty: 540 | Refills: 4
Start: 2024-10-25 | End: 2026-01-17

## 2024-10-25 RX ORDER — PROPYLTHIOURACIL 50 MG/1
2 TABLET ORAL
Refills: 0 | DISCHARGE

## 2024-10-25 RX ADMIN — HEPARIN SODIUM 5000 UNIT(S): 10000 INJECTION INTRAVENOUS; SUBCUTANEOUS at 00:30

## 2024-10-25 RX ADMIN — Medication 1 TABLET(S): at 11:55

## 2024-10-25 RX ADMIN — CALCITRIOL 0.5 MICROGRAM(S): 0.25 CAPSULE, LIQUID FILLED ORAL at 06:42

## 2024-10-25 RX ADMIN — Medication 125 MICROGRAM(S): at 04:57

## 2024-10-25 RX ADMIN — Medication 2.5 MILLIGRAM(S): at 06:42

## 2024-10-25 RX ADMIN — HEPARIN SODIUM 5000 UNIT(S): 10000 INJECTION INTRAVENOUS; SUBCUTANEOUS at 11:55

## 2024-10-25 NOTE — DISCHARGE NOTE NURSING/CASE MANAGEMENT/SOCIAL WORK - PATIENT PORTAL LINK FT
You can access the FollowMyHealth Patient Portal offered by [facility] by registering at the following website: [email or web address]. By joining [employer]’s FollowMyHealth portal, you will also be able to view your health information using other applications (apps) compatible with our system.

## 2024-10-25 NOTE — DISCHARGE NOTE NURSING/CASE MANAGEMENT/SOCIAL WORK - FINANCIAL ASSISTANCE
[facility] provides services at a reduced cost to those who are determined to be eligible through [facility]’s financial assistance program. Information regarding [facility]’s financial assistance program can be found by going to [email or web address] or by calling [phone].

## 2024-10-25 NOTE — DISCHARGE NOTE NURSING/CASE MANAGEMENT/SOCIAL WORK - NSDCPEFALRISK_GEN_ALL_CORE
For information on Fall & Injury Prevention, visit: [email or web address] OR  [email or web address] OR  https://www.cdc.gov/steadi/patient.html

## 2024-10-25 NOTE — PROGRESS NOTE ADULT - ASSESSMENT
Assessment and Plan:  ISAAC MELENDEZ is a  69yFemale with PMH Graves, L superior parathyroid adenoma now s/p total thyroidectomy, left superior parathyroidectomy 10/23.    PLAN:  - regular diet  - continue to trend calcium this morning and additional this afternoon. If stable this afternoon, possible discharge  - continue calcium supplementation  - continue home medication  - pain medication PRN    Page ENT with any questions/concerns.  Peds Page #[phone]  Adult Page #[phone]     Assessment and Plan:  ISAAC MELENDEZ is a  69yFemale with PMH Graves, L superior parathyroid adenoma now s/p total thyroidectomy, left superior parathyroidectomy 10/23.    PLAN:  - regular diet  - continue to trend calcium this morning. If stable, discharge today  - continue calcium supplementation  - continue home medication  - pain medication PRN    Page ENT with any questions/concerns.  Peds Page #[phone]  Adult Page #[phone]

## 2024-10-25 NOTE — PROGRESS NOTE ADULT - SUBJECTIVE AND OBJECTIVE BOX
OTOLARYNGOLOGY (ENT) PROGRESS NOTE    PATIENT: ISAAC MELENDEZ  MRN: [id number]  : 53  [id number]  DATE OF SERVICE:  10-24-24  			           Subjective/ Interval: Patient seen and examined at bedside. AFVSS. Pain controlled. Reports some hoarseness, improving. Some oozing from incision site this morning. No perioral/peripheral numbness/tingling.    ALLERGIES:  No Known Allergies      MEDICATIONS:  Antiinfectives:     IV fluids:  calcitriol   Capsule 0.25 MICROGram(s) Oral two times a day  calcium carbonate   1250 mG (OsCal) 1 Tablet(s) Oral every 8 hours  multivitamin 1 Tablet(s) Oral daily    Hematologic/Anticoagulation:  heparin   Injectable 5000 Unit(s) SubCutaneous every 12 hours    Pain medications/Neuro:  acetaminophen     Tablet .. 975 milliGRAM(s) Oral every 6 hours PRN  ibuprofen  Tablet. 400 milliGRAM(s) Oral every 6 hours PRN  oxyCODONE    IR 5 milliGRAM(s) Oral every 6 hours PRN    Endocrine Medications:   atorvastatin 10 milliGRAM(s) Oral at bedtime  levothyroxine 125 MICROGram(s) Oral with breakfast    All other standing medications:   amLODIPine   Tablet 2.5 milliGRAM(s) Oral daily  influenza  Vaccine (HIGH DOSE) 0.5 milliLiter(s) IntraMuscular once  polyethylene glycol 3350 17 Gram(s) Oral daily  senna 1 Tablet(s) Oral at bedtime    All other PRN medications:    Vital Signs Last 24 Hrs  T(C): 36.7 (24 Oct 2024 05:47), Max: 36.8 (23 Oct 2024 17:05)  T(F): 98 (24 Oct 2024 05:47), Max: 98.2 (23 Oct 2024 17:05)  HR: 89 (24 Oct 2024 05:47) (81 - 118)  BP: 124/78 (24 Oct 2024 05:47) (113/72 - 143/83)  BP(mean): 85 (23 Oct 2024 15:45) (85 - 105)  RR: 18 (24 Oct 2024 05:47) (13 - 20)  SpO2: 100% (24 Oct 2024 05:47) (92% - 100%)    Parameters below as of 24 Oct 2024 05:47  Patient On (Oxygen Delivery Method): room air          10-23 @ 07:01  -  10-24 @ 06:49  --------------------------------------------------------  IN:    Oral Fluid: 700 mL  Total IN: 700 mL    OUT:    Bulb (mL): 17 mL    Voided (mL): 1000 mL  Total OUT: 1017 mL    Total NET: -317 mL          10-23-24 @ 07:01  -  10-24-24 @ 06:49  --------------------------------------------------------  IN:  Total IN: 0 mL    OUT:    Bulb (mL): 17 mL  Total OUT: 17 mL    Total NET: -17 mL          General: NAD, A+Ox3  Respiratory: No respiratory distress, stridor, or stertor on room air  Voice quality: normal  Face:  Symmetric without masses or lesions  Right: Pinna wnl  Left: Pinna wnl  Neck: soft/flat, incision covered w/ steristrips w/ LISANDRA w/ serosanguinous output   Neuro: CNII-XII intact         LABS    Ca    9.2      24 Oct 2024 00:27           Coagulation Studies-       Endocrine Panel-  Calcium: 9.2 mg/dL (10-24 @ 00:27)  Calcium: 9.8 mg/dL (10-23 @ 18:44)  Calcium: 9.6 mg/dL (10-23 @ 13:16)    PTH Intact, Intraoperative.: 293.0 pg/mL *H* (10-23 @ 08:00)              MICROBIOLOGY:        RADIOLOGY & ADDITIONAL STUDIES:    
OTOLARYNGOLOGY (ENT) PROGRESS NOTE    PATIENT: ISAAC MELENDEZ  MRN: [id number]  : 53  [id number]  DATE OF SERVICE:  10-25-24  			           Subjective/ Interval: Patient seen and examined at bedside. AFVSS. Pain controlled. No perioral/peripheral numbness/tingling. Calcium repletions increased yesterday. Recent calciums 8.7, 9.2, 9.1. LISANDRA drain removed yesterday.      MEDICATIONS  (STANDING):  amLODIPine   Tablet 2.5 milliGRAM(s) Oral daily  atorvastatin 10 milliGRAM(s) Oral at bedtime  calcitriol   Capsule 0.5 MICROGram(s) Oral two times a day  calcium carbonate   1250 mG (OsCal) 2 Tablet(s) Oral three times a day  heparin   Injectable 5000 Unit(s) SubCutaneous every 12 hours  influenza  Vaccine (HIGH DOSE) 0.5 milliLiter(s) IntraMuscular once  levothyroxine 125 MICROGram(s) Oral with breakfast  multivitamin 1 Tablet(s) Oral daily  polyethylene glycol 3350 17 Gram(s) Oral daily  senna 1 Tablet(s) Oral at bedtime    MEDICATIONS  (PRN):  acetaminophen     Tablet .. 975 milliGRAM(s) Oral every 6 hours PRN Mild Pain (1 - 3)  ibuprofen  Tablet. 400 milliGRAM(s) Oral every 6 hours PRN Moderate Pain (4 - 6)  oxyCODONE    IR 5 milliGRAM(s) Oral every 6 hours PRN Severe Pain (7 - 10)        Vital Signs Last 24 Hrs  T(C): 36.7 (25 Oct 2024 02:00), Max: 36.8 (24 Oct 2024 21:40)  T(F): 98 (25 Oct 2024 02:00), Max: 98.2 (24 Oct 2024 21:40)  HR: 69 (25 Oct 2024 02:00) (69 - 89)  BP: 141/77 (25 Oct 2024 02:00) (116/66 - 145/78)  BP(mean): --  RR: 16 (25 Oct 2024 02:00) (16 - 18)  SpO2: 99% (25 Oct 2024 02:00) (97% - 100%)    Parameters below as of 25 Oct 2024 02:00  Patient On (Oxygen Delivery Method): room air        General: NAD, A+Ox3  Respiratory: No respiratory distress, stridor, or stertor on room air  Voice quality: normal  Face:  Symmetric without masses or lesions  Right: Pinna wnl  Left: Pinna wnl  Neck: soft/flat, incision covered w/ steristrips w/ LISANDRA w/ serosanguinous output   Neuro: CNII-XII intact

## 2024-10-28 LAB — SURGICAL PATHOLOGY STUDY: SIGNIFICANT CHANGE UP

## 2024-11-14 ENCOUNTER — APPOINTMENT (OUTPATIENT)
Dept: OTOLARYNGOLOGY | Facility: CLINIC | Age: 69
End: 2024-11-14
Payer: MEDICARE

## 2024-11-14 VITALS
DIASTOLIC BLOOD PRESSURE: 81 MMHG | WEIGHT: 175 LBS | HEIGHT: 63 IN | BODY MASS INDEX: 31.01 KG/M2 | SYSTOLIC BLOOD PRESSURE: 120 MMHG

## 2024-11-14 DIAGNOSIS — E04.1 NONTOXIC SINGLE THYROID NODULE: ICD-10-CM

## 2024-11-14 DIAGNOSIS — E21.3 HYPERPARATHYROIDISM, UNSPECIFIED: ICD-10-CM

## 2024-11-14 PROBLEM — J39.8 OTHER SPECIFIED DISEASES OF UPPER RESPIRATORY TRACT: Chronic | Status: ACTIVE | Noted: 2024-10-15

## 2024-11-14 PROBLEM — Z86.39 PERSONAL HISTORY OF OTHER ENDOCRINE, NUTRITIONAL AND METABOLIC DISEASE: Chronic | Status: ACTIVE | Noted: 2024-10-15

## 2024-11-14 PROBLEM — E05.90 THYROTOXICOSIS, UNSPECIFIED WITHOUT THYROTOXIC CRISIS OR STORM: Chronic | Status: ACTIVE | Noted: 2024-10-15

## 2024-11-14 PROCEDURE — 99024 POSTOP FOLLOW-UP VISIT: CPT

## 2024-11-14 RX ORDER — CALCITRIOL 0.5 UG/1
0.5 CAPSULE, LIQUID FILLED ORAL
Refills: 0 | Status: ACTIVE | COMMUNITY

## 2024-11-14 RX ORDER — LEVOTHYROXINE SODIUM 125 UG/1
125 CAPSULE ORAL
Refills: 0 | Status: ACTIVE | COMMUNITY

## 2024-11-14 RX ORDER — GRAPE SEED EXTRACT 50 MG
1250 (500 CA) TABLET ORAL
Refills: 0 | Status: ACTIVE | COMMUNITY

## 2024-11-26 RX ORDER — LEVOTHYROXINE SODIUM 0.12 MG/1
125 TABLET ORAL
Qty: 30 | Refills: 0 | Status: ACTIVE | COMMUNITY
Start: 2024-11-26 | End: 1900-01-01

## 2024-12-04 ENCOUNTER — APPOINTMENT (OUTPATIENT)
Dept: ULTRASOUND IMAGING | Facility: CLINIC | Age: 69
End: 2024-12-04
Payer: MEDICARE

## 2024-12-04 PROCEDURE — 76536 US EXAM OF HEAD AND NECK: CPT

## 2025-01-07 ENCOUNTER — APPOINTMENT (OUTPATIENT)
Dept: ENDOCRINOLOGY | Facility: CLINIC | Age: 70
End: 2025-01-07

## 2025-01-07 ENCOUNTER — NON-APPOINTMENT (OUTPATIENT)
Age: 70
End: 2025-01-07

## 2025-01-07 VITALS
HEART RATE: 93 BPM | OXYGEN SATURATION: 96 % | WEIGHT: 178 LBS | BODY MASS INDEX: 31.54 KG/M2 | SYSTOLIC BLOOD PRESSURE: 138 MMHG | DIASTOLIC BLOOD PRESSURE: 80 MMHG | HEIGHT: 63 IN

## 2025-02-19 NOTE — H&P PST ADULT - GASTROINTESTINAL
I made an outbound phone call and spoke with the patient.  She had a couple updates to her health since last update or office visit.  - Patient states that her knee brace is sliding down while she is walking, even after she had it refit.  When asked, she states that she is wearing this over her pants versus under and on the skin.  It was advised that she try wearing this on the skin when she was working/walking.  - She states that she is continuing to have knee pain and clicking in her knee.  We reviewed that the mainstay care for her knee at this time was to limit walking to what she can tolerate based on her injury and symptoms.  We also discussed that the clicking could be related to her meniscus pathology seen on MRI, hard to pinpoint over the phone.  She states that the clicking is painful.  - She states that she would like to see a surgeon at this time, to discuss surgical interventions for her continued knee pain and injury, instead of doing the gel injection scheduled for 2/24/2025 with Dr. Alonso.  We discussed that the surgeon at the [facility] will not be there for couple weeks, and that just because she is meeting with the surgeon does not mean that she will get a surgery.  We discussed that which pathology they decide to address depends on her clinical presentation, and that there is a down time or recovery time associated with all surgeries.    I let her know that we we will send a message to [facility] to confirm that we can do her desired neck step to see a surgeon in our system.  She was given the scheduling number in case they do not call her in the next coming days.    Please advise if patient can see a surgeon for her continued knee pain.    Ronny Cross ATC    negative soft/nontender/nondistended

## 2025-05-09 ENCOUNTER — APPOINTMENT (OUTPATIENT)
Dept: OTOLARYNGOLOGY | Facility: CLINIC | Age: 70
End: 2025-05-09
Payer: MEDICARE

## 2025-05-09 DIAGNOSIS — E21.3 HYPERPARATHYROIDISM, UNSPECIFIED: ICD-10-CM

## 2025-05-09 PROCEDURE — 99212 OFFICE O/P EST SF 10 MIN: CPT

## (undated) DEVICE — SYR LUER LOK 5CC

## (undated) DEVICE — LABELS BLANK W PEN

## (undated) DEVICE — ELCTR GROUNDING PAD ADULT COVIDIEN

## (undated) DEVICE — PACK HEAD & NECK

## (undated) DEVICE — DRAIN BLAKE 10FR ROUND

## (undated) DEVICE — CANISTER DISPOSABLE THIN WALL 3000CC

## (undated) DEVICE — DRAPE C ARM BAND BAG

## (undated) DEVICE — SUT SILK 2-0 30" SH

## (undated) DEVICE — SOL IRR POUR H2O 1500ML

## (undated) DEVICE — SUT MONOCRYL 4-0 27" PS-2 UNDYED

## (undated) DEVICE — DRAPE SPLIT SHEET 77" X 120"

## (undated) DEVICE — DRAPE TOWEL BLUE 17" X 24"

## (undated) DEVICE — VENODYNE/SCD SLEEVE CALF MEDIUM

## (undated) DEVICE — DRAPE 3/4 SHEET 52X76"

## (undated) DEVICE — WARMING BLANKET LOWER ADULT

## (undated) DEVICE — SOL IRR POUR NS 0.9% 500ML

## (undated) DEVICE — STAPLER SKIN VISI-STAT 35 WIDE